# Patient Record
Sex: FEMALE | Race: WHITE | NOT HISPANIC OR LATINO | Employment: FULL TIME | URBAN - METROPOLITAN AREA
[De-identification: names, ages, dates, MRNs, and addresses within clinical notes are randomized per-mention and may not be internally consistent; named-entity substitution may affect disease eponyms.]

---

## 2017-01-13 ENCOUNTER — ALLSCRIPTS OFFICE VISIT (OUTPATIENT)
Dept: OTHER | Facility: OTHER | Age: 57
End: 2017-01-13

## 2017-01-14 ENCOUNTER — GENERIC CONVERSION - ENCOUNTER (OUTPATIENT)
Dept: OTHER | Facility: OTHER | Age: 57
End: 2017-01-14

## 2017-02-14 ENCOUNTER — ALLSCRIPTS OFFICE VISIT (OUTPATIENT)
Dept: OTHER | Facility: OTHER | Age: 57
End: 2017-02-14

## 2017-03-02 ENCOUNTER — ALLSCRIPTS OFFICE VISIT (OUTPATIENT)
Dept: OTHER | Facility: OTHER | Age: 57
End: 2017-03-02

## 2017-04-20 ENCOUNTER — APPOINTMENT (OUTPATIENT)
Dept: LAB | Facility: CLINIC | Age: 57
End: 2017-04-20
Payer: COMMERCIAL

## 2017-04-20 ENCOUNTER — TRANSCRIBE ORDERS (OUTPATIENT)
Dept: LAB | Facility: CLINIC | Age: 57
End: 2017-04-20

## 2017-04-20 DIAGNOSIS — Z13.220 SCREENING FOR LIPOID DISORDERS: Primary | ICD-10-CM

## 2017-04-20 DIAGNOSIS — Z13.220 SCREENING FOR LIPOID DISORDERS: ICD-10-CM

## 2017-04-20 LAB
ALBUMIN SERPL BCP-MCNC: 3.3 G/DL (ref 3.5–5)
ALP SERPL-CCNC: 48 U/L (ref 46–116)
ALT SERPL W P-5'-P-CCNC: 19 U/L (ref 12–78)
ANION GAP SERPL CALCULATED.3IONS-SCNC: 6 MMOL/L (ref 4–13)
AST SERPL W P-5'-P-CCNC: 11 U/L (ref 5–45)
BILIRUB SERPL-MCNC: 0.4 MG/DL (ref 0.2–1)
BUN SERPL-MCNC: 12 MG/DL (ref 5–25)
CALCIUM SERPL-MCNC: 8.6 MG/DL (ref 8.3–10.1)
CHLORIDE SERPL-SCNC: 103 MMOL/L (ref 100–108)
CHOLEST SERPL-MCNC: 182 MG/DL (ref 50–200)
CO2 SERPL-SCNC: 31 MMOL/L (ref 21–32)
CREAT SERPL-MCNC: 0.57 MG/DL (ref 0.6–1.3)
GFR SERPL CREATININE-BSD FRML MDRD: >60 ML/MIN/1.73SQ M
GLUCOSE P FAST SERPL-MCNC: 81 MG/DL (ref 65–99)
HDLC SERPL-MCNC: 71 MG/DL (ref 40–60)
LDLC SERPL CALC-MCNC: 94 MG/DL (ref 0–100)
LDLC SERPL DIRECT ASSAY-MCNC: 92 MG/DL (ref 0–100)
POTASSIUM SERPL-SCNC: 3.7 MMOL/L (ref 3.5–5.3)
PROT SERPL-MCNC: 6.5 G/DL (ref 6.4–8.2)
SODIUM SERPL-SCNC: 140 MMOL/L (ref 136–145)
T4 SERPL-MCNC: 6.5 UG/DL (ref 4.7–13.3)
TRIGL SERPL-MCNC: 86 MG/DL
TSH SERPL DL<=0.05 MIU/L-ACNC: 2.88 UIU/ML (ref 0.36–3.74)

## 2017-04-20 PROCEDURE — 84436 ASSAY OF TOTAL THYROXINE: CPT

## 2017-04-20 PROCEDURE — 84443 ASSAY THYROID STIM HORMONE: CPT

## 2017-04-20 PROCEDURE — 80053 COMPREHEN METABOLIC PANEL: CPT

## 2017-04-20 PROCEDURE — 80061 LIPID PANEL: CPT

## 2017-04-20 PROCEDURE — 83721 ASSAY OF BLOOD LIPOPROTEIN: CPT

## 2017-04-20 PROCEDURE — 36415 COLL VENOUS BLD VENIPUNCTURE: CPT

## 2017-04-21 ENCOUNTER — GENERIC CONVERSION - ENCOUNTER (OUTPATIENT)
Dept: OTHER | Facility: OTHER | Age: 57
End: 2017-04-21

## 2017-09-12 ENCOUNTER — GENERIC CONVERSION - ENCOUNTER (OUTPATIENT)
Dept: OTHER | Facility: OTHER | Age: 57
End: 2017-09-12

## 2017-12-12 ENCOUNTER — ALLSCRIPTS OFFICE VISIT (OUTPATIENT)
Dept: OTHER | Facility: OTHER | Age: 57
End: 2017-12-12

## 2017-12-14 NOTE — PROGRESS NOTES
Assessment    1  Acute sinusitis (461 9) (J01 90)    Plan  Acute sinusitis    · Amoxicillin-Pot Clavulanate 875-125 MG Oral Tablet; TAKE 1 TABLET EVERY 12HOURS WITH MEALS UNTIL GONE   · Follow-up PRN Evaluation and Treatment  Follow-up  Status: Complete  Done:73Asr0239    Discussion/Summary    Brock Beltrán is stable on exam  She is to f/u PRN  will start her on Augmentin x 10 days, OTC Cough and Cold Preps PRN, is to rest, and hydrate well  The patient was counseled regarding instructions for management,-- impressions,-- importance of compliance with treatment  Possible side effects of new medications were reviewed with the patient/guardian today  The treatment plan was reviewed with the patient/guardian  The patient/guardian understands and agrees with the treatment plan      Chief Complaint  PT is being seen today due to having chills, headache, sore throat X 2 days  No fevers  Occ cough  No real sinus pain  contact -  (I saw him last week)  History of Present Illness  HPI: As above  Review of Systems   Constitutional: as noted in HPI   ENT: as noted in HPI  Respiratory: as noted in HPI  Active Problems  1  Adenomatous colon polyp (211 3) (D12 6)   2  Allergic rhinitis (477 9) (J30 9)   3  Bilateral lower extremity edema (782 3) (R60 0)   4  Encounter for screening for malignant neoplasm of colon (V76 51) (Z12 11)   5  Lipid screening (V77 91) (Z13 220)   6  Rhus dermatitis (692 6) (L23 7)   7  Screening for breast cancer (V76 10) (Z12 31)   8  Well adult on routine health check (V70 0) (Z00 00)    Past Medical History  1  History of Acute bacterial conjunctivitis of right eye (372 03) (H10 31)   2  History of Ecchymoses, spontaneous (782 7) (R23 3)   3  History of H/O total hysterectomy (V88 01) (Z90 710)   4  History of conjunctivitis (V12 49) (Z86 69)   5  History of diarrhea (V12 79) (Z87 898)   6  History of edema (V13 89) (Z87 898)   7  History of herpes zoster (V12 09) (Z86 19)   8  History of impetigo (V13 3) (Z87 2)   9  History of upper respiratory infection (V12 09) (Z87 09)   10  History of urinary stone (V13 01) (Z87 442)   11  History of Lipid screening (V77 91) (Z13 220)   12  History of Sore throat (462) (J02 9)   13  History of Sorethroat (462) (J02 9)   14  History of Subconjunctival hemorrhage of right eye (372 72) (H11 31)   15  History of Subconjunctival hemorrhage of right eye (372 72) (H11 31)   16  History of Swelling of left hand (729 81) (M79 89)  Active Problems And Past Medical History Reviewed: The active problems and past medical history were reviewed and updated today  Family History  Mother    1  Family history of Acute Myocardial Infarction (V17 3)   2  Family history of Aortic Aneurysm   3  Family history of Chronic Obstructive Pulmonary Disease   4  Family history of Coronary Artery Disease (V17 49)   5  Family history of Diabetes Mellitus (V18 0)  Father    6  Family history of Chronic Obstructive Pulmonary Disease  Sister    7  Family history of malignant neoplasm of breast (V16 3) (Z80 3)  Paternal Grandmother    6  Family history of Diabetes Mellitus (V18 0)  Paternal Grandfather    5  Family history of Stroke Syndrome (V17 1)  Paternal Aunt    8  Family history of Breast Cancer (V16 3)  Family History    11  Denied: Family history of Colon Cancer   12  Denied: Family history of Crohn's Disease   13  Denied: Family history of Liver Cancer    Social History   · Being A Social Drinker   · Former smoker (Q29 92) (V21 543)    Surgical History    1  History of  Section   2  History of Total Abdominal Hysterectomy With Removal Of Both Ovaries    Current Meds   1  Acidophilus Xtra Oral Tablet; Take 1 tablet daily as directed; Therapy: 36DBA2428 to Recorded   2  Betamethasone Dipropionate 0 05 % External Cream; APPLY SPARINGLY TO AFFECTED AREA(S) TWICE DAILY; Therapy: 33VXE7054 to (Last Rx:2017)  Requested for: 53SYB1033 Ordered   3   Caltrate 600+D 600-800 MG-UNIT Oral Tablet; TAKE 1 TABLET ORALLY DAILY (SUPPLEMENT); Therapy: 10ILQ0574 to (Evaluate:34Jbm2368) Recorded   4  Fish Oil 1000 MG Oral Capsule; TAKE 1 CAPSULE DAILY; Therapy: 04BDS8289 to Recorded   5  Minivelle 0 1 MG/24HR Transdermal Patch Twice Weekly; Therapy: 70AJK7617 to Recorded   6  Multi-Vitamin Daily Oral Tablet; TAKE 1 TABLET DAILY; Therapy: 28TVP7347 to Recorded    The medication list was reviewed and updated today  Allergies  1  No Known Drug Allergies  2  No Known Environmental Allergies   3  No Known Food Allergies    Vitals   Recorded: 59Eea1179 03:28PM   Heart Rate 60   Respiration 16   Systolic 198   Diastolic 78   Height 5 ft 4 53 in   Weight 136 lb 8 oz   BMI Calculated 23 05   BSA Calculated 1 67       Physical Exam   Constitutional  General appearance: No acute distress, well appearing and well nourished  -- NAD; VSS; pleasant  Eyes  Conjunctiva and lids: No swelling, erythema or discharge  Ears, Nose, Mouth, and Throat  External inspection of ears and nose: Normal    Otoscopic examination: Tympanic membranes translucent with normal light reflex  Canals patent without erythema  Nasal mucosa, septum, and turbinates: Abnormal  -- NM boggy, with +/-TTP over the frontal sinuses  Oropharynx: Normal with no erythema, edema, exudate or lesions  Pulmonary  Respiratory effort: No increased work of breathing or signs of respiratory distress  Auscultation of lungs: Clear to auscultation  Cardiovascular  Auscultation of heart: Normal rate and rhythm, normal S1 and S2, without murmurs  Lymphatic  Palpation of lymph nodes in neck: No lymphadenopathy     Musculoskeletal  Gait and station: Normal    Psychiatric  Orientation to person, place, and time: Normal    Mood and affect: Normal          Future Appointments    Date/Time Provider Specialty Site   02/20/2018 05:00 PM Catherine Henderson [address]       Signatures   Electronically signed by : Rodrigo Gale DO; Dec 13 2017  7:27AM EST                       (Author)

## 2018-01-09 NOTE — PROGRESS NOTES
Assessment    1  Well adult on routine health check (V70 0) (Z00 00)   2  Lipid screening (V77 91) (Z13 220)    Plan  Lipid screening, Well adult on routine health check    · (1922 [facility]) Niki Fagan CMP14 Default; Status:Active; Requested for:15Jpr1590;    · (LC) Lipid Panel With LDL/HDL Ratio; Status:Active; Requested for:28Mso1098;    · (LC) TSH+Free T4; Status:Active; Requested for:14Feb2017;     Discussion/Summary  health maintenance visit Currently, she eats a healthy diet and has an adequate exercise regimen  HYSTERECTOMY Breast cancer screening: mammogram is current  Colorectal cancer screening: colorectal cancer screening is current  The immunizations are up to date  She was advised to be evaluated by an ophthalmologist  Advice and education were given regarding aerobic exercise  Patient discussion: discussed with the patient  Chief Complaint  Patient here for Annual Wellness exam      History of Present Illness  HM, Adult Female: The patient is being seen for a health maintenance evaluation  General Health: The patient's health since the last visit is described as good  She has regular dental visits  She denies vision problems  She denies hearing loss  Immunizations status: up to date  Lifestyle:  She consumes a diverse and healthy diet  She does not have any weight concerns  She exercises regularly  She does not use tobacco  She denies alcohol use  She denies drug use  Reproductive health: the patient is postmenopausal    Screening: cancer screening reviewed and updated  Cervical cancer screening includes HYSTERECTY  Breast cancer screening includes a mammogram performed last year  Colorectal cancer screening includes a colonoscopy performed within the past ten years  metabolic screening reviewed and updated  Metabolic screening includes lipid profile performed within the past five years, glucose screening performed last year and thyroid function test performed last year     HPI: [address] Review of Systems    Constitutional: No fever, no chills, feels well, no tiredness, no recent weight gain or weight loss  Eyes: No complaints of eye pain, no red eyes, no eyesight problems, no discharge, no dry eyes, no itching of eyes  ENT: no complaints of earache, no loss of hearing, no nose bleeds, no nasal discharge, no sore throat, no hoarseness  Cardiovascular: No complaints of slow heart rate, no fast heart rate, no chest pain, no palpitations, no leg claudication, no lower extremity edema  Respiratory: No complaints of shortness of breath, no wheezing, no cough, no SOB on exertion, no orthopnea, no PND  Gastrointestinal: No complaints of abdominal pain, no constipation, no nausea or vomiting, no diarrhea, no bloody stools  Genitourinary: No complaints of dysuria, no incontinence, no pelvic pain, no dysmenorrhea, no vaginal discharge or bleeding  Musculoskeletal: No complaints of arthralgias, no myalgias, no joint swelling or stiffness, no limb pain or swelling  Integumentary: No complaints of skin rash or lesions, no itching, no skin wounds, no breast pain or lump  Neurological: No complaints of headache, no confusion, no convulsions, no numbness, no dizziness or fainting, no tingling, no limb weakness, no difficulty walking  Psychiatric: Not suicidal, no sleep disturbance, no anxiety or depression, no change in personality, no emotional problems  Endocrine: No complaints of proptosis, no hot flashes, no muscle weakness, no deepening of the voice, no feelings of weakness  Hematologic/Lymphatic: No complaints of swollen glands, no swollen glands in the neck, does not bleed easily, does not bruise easily  Active Problems    1  Adenomatous colon polyp (211 3) (D12 6)   2  Allergic rhinitis (477 9) (J30 9)   3  Encounter for screening for malignant neoplasm of colon (V76 51) (Z12 11)   4  Screening for breast cancer (V76 10) (Z12 39)   5   Well adult on routine health check (V70 0) (Z00 00)    Past Medical History    · History of Acute bacterial conjunctivitis of right eye (372 03) (H10 31)   · History of Ecchymoses, spontaneous (782 7) (R23 3)   · History of H/O total hysterectomy (V88 01) (Z90 710)   · History of conjunctivitis (V12 49) (Z86 69)   · History of diarrhea (V12 79) (J95 625)   · History of edema (V13 89) (M22 023)   · History of herpes zoster (V12 09) (Z86 19)   · History of impetigo (V13 3) (Z87 2)   · History of upper respiratory infection (V12 09) (Z87 09)   · History of urinary stone (V13 01) (Z87 442)   · History of Lipid screening (V77 91) (Z13 220)   · History of Sore throat (462) (J02 9)   · History of Sorethroat (462) (J02 9)   · History of Subconjunctival hemorrhage of right eye (372 72) (H11 31)   · History of Subconjunctival hemorrhage of right eye (372 72) (H11 31)   · History of Swelling of left hand (729 81) (M79 89)    Surgical History    · History of  Section   · History of Total Abdominal Hysterectomy With Removal Of Both Ovaries    Family History  Mother    · Family history of Acute Myocardial Infarction (V17 3)   · Family history of Aortic Aneurysm   · Family history of Chronic Obstructive Pulmonary Disease   · Family history of Coronary Artery Disease (V17 49)   · Family history of Diabetes Mellitus (V18 0)  Father    · Family history of Chronic Obstructive Pulmonary Disease  Sister    · Family history of malignant neoplasm of breast (V16 3) (Z80 3)  Paternal Grandmother    · Family history of Diabetes Mellitus (V18 0)  Paternal Grandfather    · Family history of Stroke Syndrome (V17 1)  Paternal Aunt    · Family history of Breast Cancer (V16 3)  Family History    · Denied: Family history of Colon Cancer   · Denied: Family history of Crohn's Disease   · Denied: Family history of Liver Cancer    Social History    · Being A Social Drinker   · Former smoker (S57 02) (J18 955)    Current Meds   1  Acidophilus Xtra Oral Tablet;  Take 1 tablet daily as directed; Therapy: 34XMZ6359 to Recorded   2  Caltrate 600+D 600-800 MG-UNIT Oral Tablet; TAKE 1 TABLET ORALLY DAILY   (SUPPLEMENT); Therapy: 72MGE9750 to (Evaluate:04Mav3130) Recorded   3  Fish Oil 1000 MG Oral Capsule; TAKE 1 CAPSULE DAILY; Therapy: 61JFU4787 to Recorded   4  Minivelle 0 1 MG/24HR Transdermal Patch Twice Weekly; Therapy: 26JXJ9415 to Recorded   5  Multi-Vitamin Daily Oral Tablet; TAKE 1 TABLET DAILY; Therapy: 22BCC6039 to Recorded    Allergies    1  No Known Drug Allergies    2  No Known Environmental Allergies   3  No Known Food Allergies    Vitals   Recorded: 18MOD5573 04:53PM   Heart Rate 56   Respiration 18   Systolic 512   Diastolic 80   Height 5 ft 4 53 in   Weight 137 lb    BMI Calculated 23 13   BSA Calculated 1 68     Physical Exam    Constitutional   General appearance: No acute distress, well appearing and well nourished  Head and Face   Head and face: Normal     Eyes   Conjunctiva and lids: No swelling, erythema or discharge  Pupils and irises: Equal, round, reactive to light  Ears, Nose, Mouth, and Throat   External inspection of ears and nose: Normal     Otoscopic examination: Tympanic membranes translucent with normal light reflex  Canals patent without erythema  Hearing: Normal     Nasal mucosa, septum, and turbinates: Normal without edema or erythema  Lips, teeth, and gums: Normal, good dentition  Oropharynx: Normal with no erythema, edema, exudate or lesions  Neck   Neck: Supple, symmetric, trachea midline, no masses  Thyroid: Normal, no thyromegaly  Pulmonary   Respiratory effort: No increased work of breathing or signs of respiratory distress  Auscultation of lungs: Clear to auscultation  Cardiovascular   Auscultation of heart: Normal rate and rhythm, normal S1 and S2, no murmurs  Examination of extremities for edema and/or varicosities: Normal     Abdomen   Abdomen: Non-tender, no masses      Liver and spleen: No hepatomegaly or splenomegaly  Lymphatic   Palpation of lymph nodes in neck: No lymphadenopathy  Palpation of lymph nodes in axillae: No lymphadenopathy  Musculoskeletal   Gait and station: Normal     Digits and nails: Normal without clubbing or cyanosis  Joints, bones, and muscles: Normal     Range of motion: Normal     Stability: Normal     Muscle strength/tone: Normal     Skin   Skin and subcutaneous tissue: Normal without rashes or lesions  Palpation of skin and subcutaneous tissue: Normal turgor  Neurologic   Cranial nerves: Cranial nerves II-XII intact  Cortical function: Normal mental status  Reflexes: 2+ and symmetric  Sensation: No sensory loss  Coordination: Normal finger to nose and heel to shin  Psychiatric   Judgment and insight: Normal     Orientation to person, place, and time: Normal     Recent and remote memory: Intact  Mood and affect: Normal        Results/Data  PHQ-2 Adult Depression Screening 84VTP8215 04:55PM User, [facility]     Test Name Result Flag Reference   PHQ-2 Adult Depression Score 0     Over the last two weeks, how often have you been bothered by any of the following problems?   Little interest or pleasure in doing things: Not at all - 0  Feeling down, depressed, or hopeless: Not at all - 0   PHQ-2 Adult Depression Screening Negative         Signatures   Electronically signed by : Jerri Lowe DO; Feb 14 2017  5:10PM EST                       (Author)

## 2018-01-10 NOTE — MISCELLANEOUS
Message  started with vomiting and diarrhea on friday, vomiting improved, diarrhea worse  able to keep fluids in  recommend brat diet and gatorade and fluids  will fax note for her for jury duty      Plan  Sorethroat    · Amoxicillin-Pot Clavulanate 875-125 MG Oral Tablet; TAKE 1 TABLET EVERY Door [facility]    Signatures   Electronically signed by : Talia Reyes DO;  Apr 4 2016  8:20AM EST                       (Author)

## 2018-01-12 NOTE — RESULT NOTES
Verified Results  (1) THROAT CULTURE (CULTURE, UPPER RESPIRATORY) 42MGQ6409 12:00AM Ceola Cables     Test Name Result Flag Reference   Upper Respiratory Culture Final report     Result 1 Comment     Routine respiratory katina

## 2018-01-13 VITALS
TEMPERATURE: 98.1 F | HEART RATE: 68 BPM | RESPIRATION RATE: 16 BRPM | HEIGHT: 64 IN | DIASTOLIC BLOOD PRESSURE: 78 MMHG | SYSTOLIC BLOOD PRESSURE: 118 MMHG | BODY MASS INDEX: 23.45 KG/M2 | WEIGHT: 137.38 LBS

## 2018-01-13 NOTE — RESULT NOTES
Discussion/Summary   VERY GOOD    [facility]     Verified Results  (1) LDL,DIRECT 20Apr2017 07:10AM Murlean Flank     Test Name Result Flag Reference   LDL, DIRECT 92 mg/dl  0-100   This is a fasting blood test  Water,black tea or black  coffee only after 9:00pm the night before test  Drink 2 glasses of water the morning of test         LDL Cholesterol:        Optimal          <100 mg/dl        Near Optimal     100-129 mg/dl        Above Optimal          Borderline High   130-159 mg/dl          High              160-189 mg/dl          Very High        >189 mg/dl     (1) THYROXINE 20Apr2017 07:10AM Muran Flank     Test Name Result Flag Reference   T4 TOTAL 6 5 ug/dL  4 7-13 3     (1) COMPREHENSIVE METABOLIC PANEL 62ZQH5808 01:77TX Murlean Flank     Test Name Result Flag Reference   SODIUM 140 mmol/L  136-145   POTASSIUM 3 7 mmol/L  3 5-5 3   CHLORIDE 103 mmol/L  100-108   CARBON DIOXIDE 31 mmol/L  21-32   ANION GAP (CALC) 6 mmol/L  4-13   BLOOD UREA NITROGEN 12 mg/dL  5-25   CREATININE 0 57 mg/dL L 0 60-1 30   Standardized to IDMS reference method   CALCIUM 8 6 mg/dL  8 3-10 1   BILI, TOTAL 0 40 mg/dL  0 20-1 00   ALK PHOSPHATAS 48 U/L     ALT (SGPT) 19 U/L  12-78   AST(SGOT) 11 U/L  5-45   ALBUMIN 3 3 g/dL L 3 5-5 0   TOTAL PROTEIN 6 5 g/dL  6 4-8 2   eGFR Non-African American      >60 0 ml/min/1 [address] [facility] [employer] Disease Education Program recommendations are as follows:  GFR calculation is accurate only with a steady state creatinine  Chronic Kidney disease less than 60 ml/min/1 73 sq  meters  Kidney failure less than 15 ml/min/1 73 sq  meters  GLUCOSE FASTING 81 mg/dL  65-99     (1) LIPID PANEL, FASTING 20Apr2017 07:10AM Murlean Flank     Test Name Result Flag Reference   CHOLESTEROL 182 mg/dL     HDL,DIRECT 71 mg/dL H 40-60   Specimen collection should occur prior to Metamizole administration due to the potential for falsely depressed results     LDL CHOLESTEROL CALCULATED 94 mg/dL 0-100   This is a fasting blood test  Water,black tea or black  coffee only after 9:00pm the night before test  Drink 2 glasses of water the morning of test         Triglyceride:         Normal              <150 mg/dl       Borderline High    150-199 mg/dl       High               200-499 mg/dl       Very High          >499 mg/dl  Cholesterol:         Desirable        <200 mg/dl      Borderline High  200-239 mg/dl      High             >239 mg/dl  HDL Cholesterol:        High    >59 mg/dL      Low     <41 mg/dL  LDL CALCULATED:    This screening LDL is a calculated result  It does not have the accuracy of the Direct Measured LDL in the monitoring of patients with hyperlipidemia and/or statin therapy  Direct Measure LDL (JBS152) must be ordered separately in these patients  TRIGLYCERIDES 86 mg/dL  <=150   Specimen collection should occur prior to N-Acetylcysteine or Metamizole administration due to the potential for falsely depressed results  (1) TSH 20Apr2017 07:10AM Jodi [facility]     Test Name Result Flag Reference   TSH 2 880 uIU/mL  0 358-3 740   This bloodwork is non-fasting  Please drink two glasses of water morning of  bloodwork  Patients undergoing fluorescein dye angiography may retain small amounts of fluorescein in the body for 48-72 hours post procedure  Samples containing fluorescein can produce falsely depressed TSH values  If the patient had this procedure,a specimen should be resubmitted post fluorescein clearance            The recommended reference ranges for TSH during pregnancy are as follows:  First trimester 0 1 to 2 5 uIU/mL  Second trimester  0 2 to 3 0 uIU/mL  Third trimester 0 3 to 3 0 uIU/m

## 2018-01-14 VITALS
WEIGHT: 137.13 LBS | SYSTOLIC BLOOD PRESSURE: 112 MMHG | HEART RATE: 60 BPM | BODY MASS INDEX: 23.18 KG/M2 | DIASTOLIC BLOOD PRESSURE: 78 MMHG | TEMPERATURE: 97 F | RESPIRATION RATE: 16 BRPM

## 2018-01-14 VITALS
RESPIRATION RATE: 18 BRPM | BODY MASS INDEX: 22.82 KG/M2 | HEART RATE: 56 BPM | DIASTOLIC BLOOD PRESSURE: 80 MMHG | SYSTOLIC BLOOD PRESSURE: 126 MMHG | WEIGHT: 137 LBS | HEIGHT: 65 IN

## 2018-01-14 NOTE — RESULT NOTES
Verified Results  VAS UPPER LIMB VENOUS DUPLEX [address] [place] [address], UNILATERAL/LIMITED [id number] 04:44PM Bernardo Lauren Order Number: [id number]    - Patient Instructions: To schedule this appointment, please contact Central Scheduling at [phone]  Test Name Result Flag Reference   VAS UPPER LIMB VENOUS DUPLEX SCAN, UNILATERAL/LIMITED (Report)     [facility] REPORT   CLINICAL:   Indications: Other specified soft tissue disorders [M79 89]  The patient   presents with intermittent swelling and ecchymosis of the left thumb  Risk Factors: The patient has no history of DVT, limb trauma or malignancy  FINDINGS:      Left     Impression       Int  Jugular Normal (Patent)             CONCLUSION:   Impression   LEFT UPPER LIMB:   No evidence of acute or chronic deep vein thrombosis  No evidence of superficial thrombophlebitis noted  Doppler evaluation shows a normal response to augmentation maneuvers        SIGNATURE:   Electronically Signed by: Gamaliel Aggarwal MD on 2016-12-07 07:13:08 PM

## 2018-01-16 NOTE — RESULT NOTES
Verified Results  US EXTREMITY SOFT TISSUE [id number] 09:20AM [email or web address] Order Number: [id number]   Performing Comments: LEFT THUMB WITH SWELLING AND ECCHYMOSIS   - Patient Instructions: To schedule this appointment, please contact Central Scheduling at [phone]  Test Name Result Flag Reference   US EXTREMITY SOFT TISSUE (Report)     ULTRASOUND left thumb     TECHNIQUE:  Using a high frequency transducer, the left thumb was scanned to evaluate an area of swelling  INDICATION: Patient describes intermittent, repeated episodes of swelling in the left thumb, predominantly on the ulnar side of the interphalangeal joint  COMPARISON: No priors     FINDINGS:     The ulnar side of the left thumb interphalangeal joint was evaluated for possible mass, cyst, or edema  This area is normal  There is also no hyperemia  The rest of the thumb on the dorsal, volar, radial side were also scanned and are normal        IMPRESSION:     Normal ultrasound of the left thumb near the interphalangeal joint, where patient describes repeated episodes of swelling         Workstation performed: [id number]     Signed by:   Lucero Evans MD   12/8/16

## 2018-01-18 NOTE — RESULT NOTES
Verified Results  (1) STOOL CULTURE [id number] 01:00PM Lovena Mask     Test Name Result Flag Reference   Salmonella/Shigella Screen Final report     Result 1 Comment     No Salmonella or Shigella recovered  Campylobacter Culture Final report     Result 1 Comment     No Campylobacter species isolated     E coli Shiga Toxin EIA Negative  Negative

## 2018-01-22 VITALS
SYSTOLIC BLOOD PRESSURE: 114 MMHG | HEART RATE: 56 BPM | DIASTOLIC BLOOD PRESSURE: 64 MMHG | HEIGHT: 65 IN | WEIGHT: 139.38 LBS | RESPIRATION RATE: 18 BRPM | BODY MASS INDEX: 23.22 KG/M2

## 2018-01-23 VITALS
DIASTOLIC BLOOD PRESSURE: 78 MMHG | WEIGHT: 136.5 LBS | BODY MASS INDEX: 22.74 KG/M2 | HEIGHT: 65 IN | SYSTOLIC BLOOD PRESSURE: 120 MMHG | RESPIRATION RATE: 16 BRPM | HEART RATE: 60 BPM

## 2018-02-20 ENCOUNTER — OFFICE VISIT (OUTPATIENT)
Dept: FAMILY MEDICINE CLINIC | Facility: CLINIC | Age: 58
End: 2018-02-20
Payer: COMMERCIAL

## 2018-02-20 VITALS
RESPIRATION RATE: 16 BRPM | HEIGHT: 64 IN | DIASTOLIC BLOOD PRESSURE: 70 MMHG | HEART RATE: 56 BPM | BODY MASS INDEX: 23.7 KG/M2 | SYSTOLIC BLOOD PRESSURE: 110 MMHG | WEIGHT: 138.8 LBS

## 2018-02-20 DIAGNOSIS — Z12.31 ENCOUNTER FOR SCREENING MAMMOGRAM FOR BREAST CANCER: ICD-10-CM

## 2018-02-20 DIAGNOSIS — Z00.00 WELL ADULT EXAM: Primary | ICD-10-CM

## 2018-02-20 PROCEDURE — 99396 PREV VISIT EST AGE 40-64: CPT | Performed by: FAMILY MEDICINE

## 2018-02-20 RX ORDER — ESTRADIOL 0.1 MG/D
FILM, EXTENDED RELEASE TRANSDERMAL
COMMUNITY
Start: 2014-11-11 | End: 2018-07-26 | Stop reason: SDUPTHER

## 2018-02-20 RX ORDER — MELATONIN
1000 DAILY
COMMUNITY

## 2018-02-20 RX ORDER — CHLORAL HYDRATE 500 MG
1 CAPSULE ORAL DAILY
COMMUNITY
Start: 2015-11-12

## 2018-02-20 RX ORDER — BETAMETHASONE DIPROPIONATE 0.5 MG/G
CREAM TOPICAL 2 TIMES DAILY
COMMUNITY
Start: 2017-03-02 | End: 2020-02-03

## 2018-02-20 NOTE — PROGRESS NOTES
Assessment/Plan:       Diagnoses and all orders for this visit:    Well adult exam  Comments:  up to date with health maintanence    Encounter for screening mammogram for breast cancer  -     Mammo screening bilateral w cad; Future    Screening for colon cancer  -     Ambulatory referral to Gastroenterology; Future    Other orders  -     Probiotic Product (ACIDOPHILUS XTRA PO); Take 1 tablet by mouth daily  -     betamethasone dipropionate (DIPROSONE) 0 05 % cream; Apply topically 2 (two) times a day  -     Calcium Carb-Cholecalciferol (CALTRATE 600+D) 600-800 MG-UNIT TABS; Take 1 tablet by mouth daily  -     Omega-3 Fatty Acids (FISH OIL) 1,000 mg; Take 1 capsule by mouth daily  -     estradiol (MINIVELLE) 0 1 MG/24HR; Place on the skin  -     Multiple Vitamin (MULTI-VITAMIN DAILY PO); Take 1 tablet by mouth daily  -     cholecalciferol (VITAMIN D3) 1,000 units tablet; Take 1,000 Units by mouth daily          Subjective:      Patient ID: Summer Espinoza is a 65 y o  female  Well Adult Physical   Patient here for a comprehensive physical exam The patient reports no problems    Do you take any herbs or supplements that were not prescribed by a doctor? yes   Are you taking calcium supplements? yes   Are you taking aspirin daily? no     History:  LMP: No LMP recorded  Patient has had a hysterectomy  Menopause at age 49 years  Last pap date: not in years- OB retired, total hysterectomy  Abnormal pap? no  : 2  Para: 2  Mammogram 2017  Colonoscopy           The following portions of the patient's history were reviewed and updated as appropriate: allergies, current medications, past family history, past medical history, past social history, past surgical history and problem list     Review of Systems   Constitutional: Negative  HENT: Negative  Eyes: Negative  Respiratory: Negative  Cardiovascular: Negative  Gastrointestinal: Negative  Endocrine: Negative  Genitourinary: Negative  Musculoskeletal: Negative  Skin: Negative  Allergic/Immunologic: Negative  Neurological: Negative  Hematological: Negative  Psychiatric/Behavioral: Negative  Family History   Problem Relation Age of Onset    Heart attack Mother     Aneurysm Mother     COPD Mother     Coronary artery disease Mother     Diabetes Mother    Arely Charles COPD Father     Breast cancer Sister     Diabetes Paternal Grandmother     Stroke Paternal Grandfather     Breast cancer Paternal [de-identified]     Colon cancer Family     Crohn's disease Family     Liver cancer Family      Past Medical History:   Diagnosis Date    Ecchymoses, spontaneous     Last assessed 12/05/16    Edema     Last assessed 02/26/13    H/O total hysterectomy     Impetigo     last assessed 05/24/16     Social History     Social History    Marital status: /Civil Union     Spouse name: N/A    Number of children: N/A    Years of education: N/A     Occupational History    Not on file       Social History Main Topics    Smoking status: Former Smoker    Smokeless tobacco: Never Used    Alcohol use Yes      Comment: social    Drug use: No    Sexual activity: Yes     Partners: Male     Other Topics Concern    Not on file     Social History Narrative    No narrative on file       Current Outpatient Prescriptions:     Calcium Carb-Cholecalciferol (CALTRATE 600+D) 600-800 MG-UNIT TABS, Take 1 tablet by mouth daily, Disp: , Rfl:     cholecalciferol (VITAMIN D3) 1,000 units tablet, Take 1,000 Units by mouth daily, Disp: , Rfl:     estradiol (MINIVELLE) 0 1 MG/24HR, Place on the skin, Disp: , Rfl:     Multiple Vitamin (MULTI-VITAMIN DAILY PO), Take 1 tablet by mouth daily, Disp: , Rfl:     Omega-3 Fatty Acids (FISH OIL) 1,000 mg, Take 1 capsule by mouth daily, Disp: , Rfl:     Probiotic Product (ACIDOPHILUS XTRA PO), Take 1 tablet by mouth daily, Disp: , Rfl:     betamethasone dipropionate (DIPROSONE) 0 05 % cream, Apply topically 2 (two) times a day, Disp: , Rfl:   No Known Allergies      Objective:      /70 (BP Location: Right arm, Patient Position: Sitting, Cuff Size: Standard)   Pulse 56   Resp 16   Ht 5' 4 49" (1 638 m)   Wt 63 kg (138 lb 12 8 oz)   BMI 23 46 kg/m²          Physical Exam   Constitutional: She is oriented to person, place, and time  Vital signs are normal  She appears well-developed and well-nourished  HENT:   Head: Normocephalic and atraumatic  Nose: Nose normal    Mouth/Throat: Oropharynx is clear and moist    Eyes: Conjunctivae, EOM and lids are normal  Pupils are equal, round, and reactive to light  Neck: Normal range of motion  Neck supple  Cardiovascular: Normal rate, regular rhythm, S1 normal, S2 normal, normal heart sounds and intact distal pulses  Pulmonary/Chest: Effort normal and breath sounds normal    Abdominal: Soft  Bowel sounds are normal    Musculoskeletal: Normal range of motion  Neurological: She is alert and oriented to person, place, and time  She has normal strength and normal reflexes  Skin: Skin is warm, dry and intact  Psychiatric: She has a normal mood and affect  Her speech is normal and behavior is normal  Judgment and thought content normal  Cognition and memory are normal    Nursing note and vitals reviewed

## 2018-02-20 NOTE — PATIENT INSTRUCTIONS

## 2018-04-24 ENCOUNTER — OFFICE VISIT (OUTPATIENT)
Dept: FAMILY MEDICINE CLINIC | Facility: CLINIC | Age: 58
End: 2018-04-24
Payer: COMMERCIAL

## 2018-04-24 VITALS
HEART RATE: 64 BPM | RESPIRATION RATE: 12 BRPM | DIASTOLIC BLOOD PRESSURE: 68 MMHG | BODY MASS INDEX: 23.87 KG/M2 | TEMPERATURE: 97.8 F | SYSTOLIC BLOOD PRESSURE: 140 MMHG | WEIGHT: 141.2 LBS

## 2018-04-24 DIAGNOSIS — J01.11 ACUTE RECURRENT FRONTAL SINUSITIS: Primary | ICD-10-CM

## 2018-04-24 PROCEDURE — 99213 OFFICE O/P EST LOW 20 MIN: CPT | Performed by: FAMILY MEDICINE

## 2018-04-24 RX ORDER — AMOXICILLIN AND CLAVULANATE POTASSIUM 875; 125 MG/1; MG/1
1 TABLET, FILM COATED ORAL 2 TIMES DAILY WITH MEALS
Qty: 20 TABLET | Refills: 0 | Status: SHIPPED | OUTPATIENT
Start: 2018-04-24 | End: 2018-05-04

## 2018-04-24 NOTE — ASSESSMENT & PLAN NOTE
Pt is stable on exam   Will treat with Augmentin x 10 days, OTC Cough and Cold Preps PRN, rest, and good PO hydration

## 2018-04-24 NOTE — PROGRESS NOTES
Assessment/Plan:    Acute recurrent frontal sinusitis  Pt is stable on exam   Will treat with Augmentin x 10 days, OTC Cough and Cold Preps PRN, rest, and good PO hydration  Diagnoses and all orders for this visit:    Acute recurrent frontal sinusitis  -     amoxicillin-clavulanate (AUGMENTIN) 875-125 mg per tablet; Take 1 tablet by mouth 2 (two) times a day with meals for 10 days          Subjective:      Patient ID: Mickie Nj is a 65 y o  female  Sore Throat    This is a new problem  The current episode started in the past 7 days  There has been no fever  Associated symptoms include congestion, coughing and ear pain  Cough   Associated symptoms include ear pain and a sore throat  Pertinent negatives include no fever  Sinusitis   Associated symptoms include congestion, coughing, ear pain, sinus pressure and a sore throat  Earache    Associated symptoms include coughing and a sore throat         The following portions of the patient's history were reviewed and updated as appropriate: allergies, current medications, past family history, past social history, past surgical history and problem list     Past Medical History:   Diagnosis Date    Ecchymoses, spontaneous     Last assessed 12/05/16    Edema     Last assessed 02/26/13    H/O total hysterectomy     Impetigo     last assessed 05/24/16       Current Outpatient Prescriptions:     amoxicillin-clavulanate (AUGMENTIN) 875-125 mg per tablet, Take 1 tablet by mouth 2 (two) times a day with meals for 10 days, Disp: 20 tablet, Rfl: 0    betamethasone dipropionate (DIPROSONE) 0 05 % cream, Apply topically 2 (two) times a day, Disp: , Rfl:     Calcium Carb-Cholecalciferol (CALTRATE 600+D) 600-800 MG-UNIT TABS, Take 1 tablet by mouth daily, Disp: , Rfl:     cholecalciferol (VITAMIN D3) 1,000 units tablet, Take 1,000 Units by mouth daily, Disp: , Rfl:     estradiol (MINIVELLE) 0 1 MG/24HR, Place on the skin, Disp: , Rfl:     Multiple Vitamin (MULTI-VITAMIN DAILY PO), Take 1 tablet by mouth daily, Disp: , Rfl:     Omega-3 Fatty Acids (FISH OIL) 1,000 mg, Take 1 capsule by mouth daily, Disp: , Rfl:     Probiotic Product (ACIDOPHILUS XTRA PO), Take 1 tablet by mouth daily, Disp: , Rfl:     No Known Allergies      Review of Systems   Constitutional: Negative for activity change and fever  HENT: Positive for congestion, ear pain, sinus pressure and sore throat  Respiratory: Positive for cough  Objective:      /68 (BP Location: Right arm, Patient Position: Sitting, Cuff Size: Standard)   Pulse 64   Temp 97 8 °F (36 6 °C) (Tympanic)   Resp 12   Wt 64 kg (141 lb 3 2 oz)   BMI 23 87 kg/m²          Physical Exam   Constitutional: She is oriented to person, place, and time  She appears well-developed and well-nourished  HENT:   Head: Normocephalic and atraumatic  Right Ear: Hearing, tympanic membrane, external ear and ear canal normal    Left Ear: Hearing, tympanic membrane, external ear and ear canal normal    Nose: Mucosal edema present  Right sinus exhibits frontal sinus tenderness  Left sinus exhibits frontal sinus tenderness  Mouth/Throat: Oropharynx is clear and moist  No oropharyngeal exudate  Eyes: Conjunctivae are normal    Neck: Normal range of motion  Neck supple  No thyromegaly present  Cardiovascular: Normal rate, regular rhythm and normal heart sounds  Exam reveals no gallop and no friction rub  No murmur heard  Pulmonary/Chest: Effort normal and breath sounds normal  No respiratory distress  She has no wheezes  She has no rales  Lymphadenopathy:     She has no cervical adenopathy  Neurological: She is alert and oriented to person, place, and time  Skin: She is not diaphoretic  Psychiatric: She has a normal mood and affect  Her behavior is normal  Judgment and thought content normal    Nursing note and vitals reviewed

## 2018-07-02 ENCOUNTER — HOSPITAL ENCOUNTER (OUTPATIENT)
Dept: RADIOLOGY | Age: 58
Discharge: HOME | End: 2018-07-02
Payer: COMMERCIAL

## 2018-07-02 DIAGNOSIS — Z12.31 ENCOUNTER FOR SCREENING MAMMOGRAM FOR BREAST CANCER: ICD-10-CM

## 2018-07-02 PROCEDURE — 77063 BREAST TOMOSYNTHESIS BI: CPT

## 2018-07-02 PROCEDURE — 77067 SCR MAMMO BI INCL CAD: CPT

## 2018-07-26 DIAGNOSIS — N95.9 MENOPAUSAL DISORDER: Primary | ICD-10-CM

## 2018-07-26 RX ORDER — ESTRADIOL 0.1 MG/D
1 FILM, EXTENDED RELEASE TRANSDERMAL 2 TIMES WEEKLY
Qty: 8 PATCH | Refills: 0 | Status: SHIPPED | OUTPATIENT
Start: 2018-07-26 | End: 2018-08-23 | Stop reason: SDUPTHER

## 2018-07-26 NOTE — TELEPHONE ENCOUNTER
Patient said Yun[facility] Robert could fill as her OBGYN is retiring  Please fill estradiol (MINIVELLE) 0 1 MG/24HR, Route: Transdermal, Sig: Place on the skin  Patient gets a 90 supply (she gets 3 boxes with 8 patches in each box)  Please send to [facility] [phone]

## 2018-08-23 ENCOUNTER — OFFICE VISIT (OUTPATIENT)
Dept: FAMILY MEDICINE CLINIC | Facility: CLINIC | Age: 58
End: 2018-08-23
Payer: COMMERCIAL

## 2018-08-23 VITALS
HEIGHT: 64 IN | BODY MASS INDEX: 23.73 KG/M2 | OXYGEN SATURATION: 99 % | RESPIRATION RATE: 14 BRPM | TEMPERATURE: 97.4 F | SYSTOLIC BLOOD PRESSURE: 122 MMHG | HEART RATE: 50 BPM | DIASTOLIC BLOOD PRESSURE: 82 MMHG | WEIGHT: 139 LBS

## 2018-08-23 DIAGNOSIS — N95.9 MENOPAUSAL DISORDER: ICD-10-CM

## 2018-08-23 DIAGNOSIS — H92.02 LEFT EAR PAIN: Primary | ICD-10-CM

## 2018-08-23 PROBLEM — J01.11 ACUTE RECURRENT FRONTAL SINUSITIS: Status: RESOLVED | Noted: 2018-04-24 | Resolved: 2018-08-23

## 2018-08-23 PROCEDURE — 99213 OFFICE O/P EST LOW 20 MIN: CPT | Performed by: FAMILY MEDICINE

## 2018-08-23 PROCEDURE — 3008F BODY MASS INDEX DOCD: CPT | Performed by: FAMILY MEDICINE

## 2018-08-23 RX ORDER — ESTRADIOL 0.1 MG/D
1 FILM, EXTENDED RELEASE TRANSDERMAL 2 TIMES WEEKLY
Qty: 24 PATCH | Refills: 3 | Status: SHIPPED | OUTPATIENT
Start: 2018-08-23 | End: 2018-11-21

## 2018-08-23 NOTE — PROGRESS NOTES
Assessment/Plan:    Problem List Items Addressed This Visit     Left ear pain - Primary     likley dental vs allergic           Other Visit Diagnoses     Menopausal disorder        Relevant Medications    estradiol (MINIVELLE) 0 1 MG/24HR          There are no Patient Instructions on file for this visit  No Follow-up on file  Subjective:      Patient ID: Regina Santamaria is a 65 y o  female  Chief Complaint   Patient presents with   Sae Thurston     Patient here with left ear pain no discharge started yesterday        C/o left ear pain- yesterday  No other symptoms  Tylenol without relief      Earache    There is pain in the left ear  This is a new problem  The current episode started yesterday  The problem has been unchanged  There has been no fever  The pain is moderate  Pertinent negatives include no coughing, diarrhea, ear discharge, headaches, hearing loss, neck pain, rash, rhinorrhea, sore throat or vomiting  She has tried acetaminophen for the symptoms  The treatment provided no relief  The following portions of the patient's history were reviewed and updated as appropriate:  past social history    Review of Systems   Constitutional: Negative  HENT: Positive for dental problem (dental work on left upper side) and ear pain  Negative for ear discharge, hearing loss, rhinorrhea and sore throat  Eyes: Negative  Respiratory: Negative for cough  Cardiovascular: Negative  Gastrointestinal: Negative for diarrhea and vomiting  Endocrine: Negative  Genitourinary: Negative  Musculoskeletal: Negative for neck pain  Skin: Negative for rash  Allergic/Immunologic: Negative  Neurological: Negative for headaches  Hematological: Negative  Psychiatric/Behavioral: Negative            Current Outpatient Prescriptions   Medication Sig Dispense Refill    betamethasone dipropionate (DIPROSONE) 0 05 % cream Apply topically 2 (two) times a day      Calcium Carb-Cholecalciferol (CALTRATE 600+D) 600-800 MG-UNIT TABS Take 1 tablet by mouth daily      cholecalciferol (VITAMIN D3) 1,000 units tablet Take 1,000 Units by mouth daily      estradiol (MINIVELLE) 0 1 MG/24HR Place 1 patch on the skin 2 (two) times a week for 90 days 24 patch 3    Multiple Vitamin (MULTI-VITAMIN DAILY PO) Take 1 tablet by mouth daily      Omega-3 Fatty Acids (FISH OIL) 1,000 mg Take 1 capsule by mouth daily      Probiotic Product (ACIDOPHILUS XTRA PO) Take 1 tablet by mouth daily       No current facility-administered medications for this visit  Objective:    /82   Pulse (!) 50   Temp (!) 97 4 °F (36 3 °C)   Resp 14   Ht 5' 4 49" (1 638 m)   Wt 63 kg (139 lb)   SpO2 99%   BMI 23 50 kg/m²        Physical Exam   Constitutional: She appears well-developed and well-nourished  HENT:   Head: Normocephalic and atraumatic  Right Ear: External ear normal    Left Ear: External ear normal    Nose: Nose normal    Mouth/Throat: Oropharynx is clear and moist    Eyes: Pupils are equal, round, and reactive to light  Neck: Normal range of motion  Neck supple  Cardiovascular: Normal rate, regular rhythm, normal heart sounds and intact distal pulses  Pulmonary/Chest: Effort normal and breath sounds normal    Abdominal: Soft  Bowel sounds are normal    Musculoskeletal: Normal range of motion  Neurological: She is alert  Skin: Skin is warm and dry  Nursing note and vitals reviewed               Talia Reyes DO

## 2018-11-21 DIAGNOSIS — Z79.890 HORMONE REPLACEMENT THERAPY (HRT): Primary | ICD-10-CM

## 2018-11-21 DIAGNOSIS — Z79.890 HORMONE REPLACEMENT THERAPY (HRT): ICD-10-CM

## 2018-11-21 RX ORDER — ESTRADIOL 0.1 MG/D
1 FILM, EXTENDED RELEASE TRANSDERMAL 2 TIMES WEEKLY
Qty: 8 PATCH | Refills: 5 | Status: SHIPPED | OUTPATIENT
Start: 2018-11-22 | End: 2018-11-21 | Stop reason: SDUPTHER

## 2018-11-21 RX ORDER — ESTRADIOL 0.1 MG/D
1 FILM, EXTENDED RELEASE TRANSDERMAL 2 TIMES WEEKLY
Qty: 24 PATCH | Refills: 3 | Status: SHIPPED | OUTPATIENT
Start: 2018-11-22 | End: 2019-10-01 | Stop reason: SDUPTHER

## 2019-07-03 ENCOUNTER — TELEPHONE (OUTPATIENT)
Dept: FAMILY MEDICINE CLINIC | Facility: CLINIC | Age: 59
End: 2019-07-03

## 2019-07-03 DIAGNOSIS — Z12.39 SCREENING FOR BREAST CANCER: Primary | ICD-10-CM

## 2019-07-03 NOTE — TELEPHONE ENCOUNTER
Patient is scheduled for her physical with you on 9/24/19 but she is inquiring if you would be able to order her an updated routine mammogram screening bilateral w 3d & cad? Please advise

## 2019-07-31 ENCOUNTER — HOSPITAL ENCOUNTER (OUTPATIENT)
Dept: RADIOLOGY | Age: 59
Discharge: HOME | End: 2019-07-31
Payer: COMMERCIAL

## 2019-07-31 VITALS — HEIGHT: 65 IN | WEIGHT: 137 LBS | BODY MASS INDEX: 22.82 KG/M2

## 2019-07-31 DIAGNOSIS — Z12.39 SCREENING FOR BREAST CANCER: ICD-10-CM

## 2019-07-31 PROCEDURE — 77063 BREAST TOMOSYNTHESIS BI: CPT

## 2019-07-31 PROCEDURE — 77067 SCR MAMMO BI INCL CAD: CPT

## 2019-08-01 ENCOUNTER — TELEPHONE (OUTPATIENT)
Dept: FAMILY MEDICINE CLINIC | Facility: CLINIC | Age: 59
End: 2019-08-01

## 2019-09-17 ENCOUNTER — OFFICE VISIT (OUTPATIENT)
Dept: FAMILY MEDICINE CLINIC | Facility: CLINIC | Age: 59
End: 2019-09-17
Payer: COMMERCIAL

## 2019-09-17 VITALS
BODY MASS INDEX: 22.99 KG/M2 | HEIGHT: 65 IN | HEART RATE: 50 BPM | DIASTOLIC BLOOD PRESSURE: 70 MMHG | RESPIRATION RATE: 13 BRPM | WEIGHT: 138 LBS | OXYGEN SATURATION: 100 % | TEMPERATURE: 97.9 F | SYSTOLIC BLOOD PRESSURE: 120 MMHG

## 2019-09-17 DIAGNOSIS — H11.31 CONJUNCTIVAL HEMORRHAGE, RIGHT: Primary | ICD-10-CM

## 2019-09-17 DIAGNOSIS — D12.6 ADENOMATOUS POLYP OF COLON, UNSPECIFIED PART OF COLON: ICD-10-CM

## 2019-09-17 PROBLEM — H92.02 LEFT EAR PAIN: Status: RESOLVED | Noted: 2018-08-23 | Resolved: 2019-09-17

## 2019-09-17 PROCEDURE — 99213 OFFICE O/P EST LOW 20 MIN: CPT | Performed by: FAMILY MEDICINE

## 2019-09-17 NOTE — PROGRESS NOTES
Assessment/Plan:    1  Conjunctival hemorrhage, right  Assessment & Plan: To see ophtho        2  Adenomatous polyp of colon, unspecified part of colon  -     Ambulatory referral to Gastroenterology; Future              There are no Patient Instructions on file for this visit  No follow-ups on file  Subjective:      Patient ID: Ranulfo Ng is a 63 y o  female  Chief Complaint   Patient presents with   [facility] Problem     Patient here with right eye red burning feels sore        Right before vacation 2 weeks ago broke blood vessel in eye, cleared up 1 week later  yesteray out of work same eye with red center  Eye is sore  No discharge  No goop  Vision is ok  Computer all day    Eye Problem    The right eye is affected  This is a recurrent problem  The current episode started 1 to 4 weeks ago  The problem has been unchanged  There was no injury mechanism  There is no known exposure to pink eye  She does not wear contacts  Associated symptoms include eye redness  Pertinent negatives include no blurred vision or foreign body sensation  She has tried nothing for the symptoms  The following portions of the patient's history were reviewed and updated as appropriate:  past social history    Review of Systems   Constitutional: Negative  HENT: Negative  Eyes: Positive for redness  Negative for blurred vision  Respiratory: Negative  Cardiovascular: Negative  Gastrointestinal: Negative  Endocrine: Negative  Genitourinary: Negative  Musculoskeletal: Negative  Skin: Negative  Allergic/Immunologic: Negative  Neurological: Negative  Hematological: Negative  Psychiatric/Behavioral: Negative            Current Outpatient Medications   Medication Sig Dispense Refill    betamethasone dipropionate (DIPROSONE) 0 05 % cream Apply topically 2 (two) times a day      Calcium Carb-Cholecalciferol (CALTRATE 600+D) 600-800 MG-UNIT TABS Take 1 tablet by mouth daily      cholecalciferol (VITAMIN D3) 1,000 units tablet Take 1,000 Units by mouth daily      Multiple Vitamin (MULTI-VITAMIN DAILY PO) Take 1 tablet by mouth daily      Omega-3 Fatty Acids (FISH OIL) 1,000 mg Take 1 capsule by mouth daily      Probiotic Product (ACIDOPHILUS XTRA PO) Take 1 tablet by mouth daily      estradiol (MINIVELLE) 0 1 MG/24HR Place 1 patch on the skin 2 (two) times a week for 90 days 24 patch 3    estradiol (VIVELLE-DOT) 0 1 MG/24HR Place 1 patch on the skin 2 (two) times a week for 90 days 24 patch 3     No current facility-administered medications for this visit  Objective:    /70   Pulse (!) 50   Temp 97 9 °F (36 6 °C)   Resp 13   Ht 5' 5" (1 651 m)   Wt 62 6 kg (138 lb)   SpO2 100%   BMI 22 96 kg/m²        Physical Exam   Eyes: Pupils are equal, round, and reactive to light  EOM and lids are normal  Right conjunctiva is injected  Neck: Normal range of motion  Neck supple  Cardiovascular: Normal rate, regular rhythm, normal heart sounds and intact distal pulses  Pulmonary/Chest: Effort normal and breath sounds normal    Abdominal: Soft  Bowel sounds are normal    Musculoskeletal: Normal range of motion                Liana Arenas DO

## 2019-09-30 ENCOUNTER — OFFICE VISIT (OUTPATIENT)
Dept: FAMILY MEDICINE CLINIC | Facility: CLINIC | Age: 59
End: 2019-09-30
Payer: COMMERCIAL

## 2019-09-30 VITALS
WEIGHT: 137.8 LBS | DIASTOLIC BLOOD PRESSURE: 74 MMHG | SYSTOLIC BLOOD PRESSURE: 130 MMHG | TEMPERATURE: 97.6 F | OXYGEN SATURATION: 98 % | HEART RATE: 53 BPM | RESPIRATION RATE: 14 BRPM | BODY MASS INDEX: 22.96 KG/M2 | HEIGHT: 65 IN

## 2019-09-30 DIAGNOSIS — H00.025 HORDEOLUM INTERNUM OF LEFT LOWER EYELID: Primary | ICD-10-CM

## 2019-09-30 PROBLEM — H11.31 CONJUNCTIVAL HEMORRHAGE, RIGHT: Status: RESOLVED | Noted: 2019-09-17 | Resolved: 2019-09-30

## 2019-09-30 PROCEDURE — 99213 OFFICE O/P EST LOW 20 MIN: CPT | Performed by: FAMILY MEDICINE

## 2019-09-30 RX ORDER — ERYTHROMYCIN 5 MG/G
0.5 OINTMENT OPHTHALMIC EVERY 6 HOURS SCHEDULED
Qty: 3.5 G | Refills: 0 | Status: SHIPPED | OUTPATIENT
Start: 2019-09-30 | End: 2020-02-03

## 2019-09-30 NOTE — PROGRESS NOTES
Assessment/Plan:    1  Hordeolum internum of left lower eyelid  Assessment & Plan:  Warm compresses  erythro ointment    Orders:  -     erythromycin (ILOTYCIN) ophthalmic ointment; Administer 0 5 inches into the left eye every 6 (six) hours                There are no Patient Instructions on file for this visit  No follow-ups on file  Subjective:      Patient ID: Keren Gurrola is a 63 y o  female  Chief Complaint   Patient presents with   [facility] Problem     Patient here with left lower eye lid style red painful since Saturday morning        Here for acute eye issue  Saturday morning woke up with pain in eye lid  Left eye- at first didn't see anything  Sunday started with bump  Warm compresses    Eye Pain    The left eye is affected  This is a new problem  The current episode started in the past 7 days  The problem occurs constantly  There was no injury mechanism  The pain is at a severity of 3/10  There is no known exposure to pink eye  She does not wear contacts  Pertinent negatives include no blurred vision, double vision, foreign body sensation, photophobia or recent URI  She has tried nothing for the symptoms  The following portions of the patient's history were reviewed and updated as appropriate:  past social history    Review of Systems   Constitutional: Negative  HENT: Negative  Eyes: Positive for pain  Negative for blurred vision, double vision and photophobia  Respiratory: Negative  Cardiovascular: Negative  Gastrointestinal: Negative  Endocrine: Negative  Genitourinary: Negative  Musculoskeletal: Negative  Skin: Negative  Allergic/Immunologic: Negative  Neurological: Negative  Hematological: Negative  Psychiatric/Behavioral: Negative            Current Outpatient Medications   Medication Sig Dispense Refill    betamethasone dipropionate (DIPROSONE) 0 05 % cream Apply topically 2 (two) times a day      Calcium Carb-Cholecalciferol (CALTRATE 600+D) 600-800 MG-UNIT TABS Take 1 tablet by mouth daily      cholecalciferol (VITAMIN D3) 1,000 units tablet Take 1,000 Units by mouth daily      Multiple Vitamin (MULTI-VITAMIN DAILY PO) Take 1 tablet by mouth daily      Omega-3 Fatty Acids (FISH OIL) 1,000 mg Take 1 capsule by mouth daily      Probiotic Product (ACIDOPHILUS XTRA PO) Take 1 tablet by mouth daily      erythromycin (ILOTYCIN) ophthalmic ointment Administer 0 5 inches into the left eye every 6 (six) hours 3 5 g 0    estradiol (MINIVELLE) 0 1 MG/24HR Place 1 patch on the skin 2 (two) times a week for 90 days 24 patch 3    estradiol (VIVELLE-DOT) 0 1 MG/24HR Place 1 patch on the skin 2 (two) times a week for 90 days 24 patch 3     No current facility-administered medications for this visit  Objective:    /74   Pulse (!) 53   Temp 97 6 °F (36 4 °C)   Resp 14   Ht 5' 5" (1 651 m)   Wt 62 5 kg (137 lb 12 8 oz)   SpO2 98%   BMI 22 93 kg/m²        Physical Exam   Constitutional: She appears well-developed and well-nourished  Eyes: Pupils are equal, round, and reactive to light  Conjunctivae and EOM are normal  Left eye exhibits hordeolum  Neck: Normal range of motion  Neck supple  Cardiovascular: Normal rate, regular rhythm, normal heart sounds and intact distal pulses  Pulmonary/Chest: Effort normal and breath sounds normal    Abdominal: Soft  Bowel sounds are normal    Nursing note and vitals reviewed               Dilan Gibson DO

## 2019-10-01 ENCOUNTER — TELEPHONE (OUTPATIENT)
Dept: FAMILY MEDICINE CLINIC | Facility: CLINIC | Age: 59
End: 2019-10-01

## 2019-10-01 DIAGNOSIS — Z79.890 HORMONE REPLACEMENT THERAPY (HRT): ICD-10-CM

## 2019-10-01 RX ORDER — ESTRADIOL 0.1 MG/D
FILM, EXTENDED RELEASE TRANSDERMAL
Qty: 24 PATCH | Refills: 4 | Status: SHIPPED | OUTPATIENT
Start: 2019-10-01 | End: 2020-11-24

## 2019-10-01 NOTE — TELEPHONE ENCOUNTER
Patient called and stated that she saw Dr Nancy Bourgeois yesterday , she forgot to mention the she has a sty on her left eye, patient wanted to know if it is contagious as she is going to be going to another appointment today, she also wants to know about how long it usually takes for it to go away

## 2019-10-01 NOTE — TELEPHONE ENCOUNTER
She is not contagious and it might take 1 week to improve   If it gets worse will need to see lorenzo

## 2019-10-07 ENCOUNTER — OFFICE VISIT (OUTPATIENT)
Dept: FAMILY MEDICINE CLINIC | Facility: CLINIC | Age: 59
End: 2019-10-07
Payer: COMMERCIAL

## 2019-10-07 VITALS
TEMPERATURE: 97.6 F | DIASTOLIC BLOOD PRESSURE: 74 MMHG | BODY MASS INDEX: 22.99 KG/M2 | SYSTOLIC BLOOD PRESSURE: 112 MMHG | HEIGHT: 65 IN | HEART RATE: 51 BPM | WEIGHT: 138 LBS | OXYGEN SATURATION: 97 % | RESPIRATION RATE: 16 BRPM

## 2019-10-07 DIAGNOSIS — H00.025 HORDEOLUM INTERNUM OF LEFT LOWER EYELID: Primary | ICD-10-CM

## 2019-10-07 PROCEDURE — 3008F BODY MASS INDEX DOCD: CPT | Performed by: FAMILY MEDICINE

## 2019-10-07 PROCEDURE — 99213 OFFICE O/P EST LOW 20 MIN: CPT | Performed by: FAMILY MEDICINE

## 2019-10-07 RX ORDER — DOXYCYCLINE 100 MG/1
100 CAPSULE ORAL 2 TIMES DAILY
Qty: 20 CAPSULE | Refills: 0 | Status: SHIPPED | OUTPATIENT
Start: 2019-10-07 | End: 2019-10-17

## 2019-10-07 NOTE — PROGRESS NOTES
Assessment/Plan:    No problem-specific Assessment & Plan notes found for this encounter  Diagnoses and all orders for this visit:    Hordeolum internum of left lower eyelid  Comments:  Pt is stable on exam   Continue warm compresses and Eryth Ophth ointment; will add PO Doxycycline  Pt to f/u with her Ophthalmologist if no resolution  Orders:  -     doxycycline monohydrate (MONODOX) 100 mg capsule; Take 1 capsule (100 mg total) by mouth 2 (two) times a day for 10 days          Subjective:      Patient ID: Ronald Banda is a 63 y o  female  Corina Aidanin presents today with the c/o stye to the left lower lid x 2 weeks  Is using the previously ordered Erythromycin Ophth ointments, warm compresses, etc, but remains          The following portions of the patient's history were reviewed and updated as appropriate: allergies, current medications, past family history, past social history, past surgical history and problem list     Past Medical History:   Diagnosis Date    Conjunctival hemorrhage, right 9/17/2019    Ecchymoses, spontaneous     Last assessed 12/05/16    Edema     Last assessed 02/26/13    H/O total hysterectomy     Impetigo     last assessed 05/24/16    Left ear pain 8/23/2018       Current Outpatient Medications:     betamethasone dipropionate (DIPROSONE) 0 05 % cream, Apply topically 2 (two) times a day, Disp: , Rfl:     Calcium Carb-Cholecalciferol (CALTRATE 600+D) 600-800 MG-UNIT TABS, Take 1 tablet by mouth daily, Disp: , Rfl:     cholecalciferol (VITAMIN D3) 1,000 units tablet, Take 1,000 Units by mouth daily, Disp: , Rfl:     doxycycline monohydrate (MONODOX) 100 mg capsule, Take 1 capsule (100 mg total) by mouth 2 (two) times a day for 10 days, Disp: 20 capsule, Rfl: 0    erythromycin (ILOTYCIN) ophthalmic ointment, Administer 0 5 inches into the left eye every 6 (six) hours, Disp: 3 5 g, Rfl: 0    estradiol (MINIVELLE) 0 1 MG/24HR, Place 1 patch on the skin 2 (two) times a week for 90 days, Disp: 24 patch, Rfl: 3    estradiol (VIVELLE-DOT) 0 1 MG/24HR, APPLY 1 PATCH ON THE SKIN TWICE A WEEK, Disp: 24 patch, Rfl: 4    Multiple Vitamin (MULTI-VITAMIN DAILY PO), Take 1 tablet by mouth daily, Disp: , Rfl:     Omega-3 Fatty Acids (FISH OIL) 1,000 mg, Take 1 capsule by mouth daily, Disp: , Rfl:     Probiotic Product (ACIDOPHILUS XTRA PO), Take 1 tablet by mouth daily, Disp: , Rfl:     No Known Allergies    Social History     Tobacco Use    Smoking status: Former Smoker    Smokeless tobacco: Never Used   Substance Use Topics    Alcohol use: Yes     Comment: social    Drug use: No         Review of Systems   Constitutional: Negative for activity change and fever  Eyes: Positive for redness  Objective:      /74   Pulse (!) 51   Temp 97 6 °F (36 4 °C)   Resp 16   Ht 5' 5 47" (1 663 m)   Wt 62 6 kg (138 lb)   SpO2 97%   BMI 22 63 kg/m²          Physical Exam   Constitutional: She is oriented to person, place, and time  She appears well-developed and well-nourished  No distress  HENT:   Head: Normocephalic and atraumatic  Eyes: Pupils are equal, round, and reactive to light  Conjunctivae and EOM are normal  Right eye exhibits no hordeolum  Left eye exhibits hordeolum  Neurological: She is alert and oriented to person, place, and time  Skin: She is not diaphoretic  Psychiatric: She has a normal mood and affect  Her behavior is normal  Judgment and thought content normal    Nursing note and vitals reviewed

## 2019-11-22 ENCOUNTER — TELEPHONE (OUTPATIENT)
Dept: GASTROENTEROLOGY | Facility: CLINIC | Age: 59
End: 2019-11-22

## 2019-11-22 DIAGNOSIS — Z12.11 SCREENING FOR MALIGNANT NEOPLASM OF COLON: ICD-10-CM

## 2019-11-22 DIAGNOSIS — Z86.010 HISTORY OF COLONIC POLYPS: Primary | ICD-10-CM

## 2019-11-22 NOTE — TELEPHONE ENCOUNTER
Pt scheduled for 2/3/20 with Dr Zulema Germain at [facility]  Instructions mailed to pt  Shobha Enamorado - pt has [facility] will need prior auth      Please send Suprep to pharmacy on file  Thank you!

## 2019-11-22 NOTE — TELEPHONE ENCOUNTER
Patients GI provider:  New patient    Number to return call: [phone]    Reason for call: Pt calling returning a missed call to schedule recall colonoscopy    Scheduled procedure/appointment date if applicable: Apt/procedure n/a

## 2019-11-22 NOTE — TELEPHONE ENCOUNTER
LVM for pt to return call to schedule REPEAT (recall) Colonoscopy with Dr Magdalene Nuno at [facility]   Pt is due

## 2019-11-22 NOTE — TELEPHONE ENCOUNTER
Diagnosis Information     Diagnosis   D12 6 (ICD-10-CM) - Adenomatous polyp of colon, unspecified part of colon   Referral Notes   Number of Notes: 2   Type Date User Summary Attachment   Specialty Comments 11/07/2019  2:57 PM Martha Kitchen 11/07/19 -   Note       11/07/19  Screened by: Martha Kitchen     Referring Provider      Pre- Screening: Body mass index is 22 63 kg/m²  Has patient been referred for a routine screening Colonoscopy? yes  Is the patient between 39-70 years old? yes     SCHEDULING STAFF  · If the patient is between 45yrs-49yrs, please advise patient to confirm benefits/coverage with their insurance company for a routine screening colonoscopy, some insurance carriers will only cover at [phone] or older  · If the patient is over 66years old, please schedule an office visit      Do you have any of the following symptoms? NO        Have you had a coronary or vascular stent within the last year? no     Have you had a heart attack or stroke in the last 6 months? no     Have you had intestinal surgery in the last 3 months? no     Do you have problems with: Sleep Apnea     Do you use: CPAP/BiPAP     Have you been hospitalized in the last Month? no     Have you been diagnosed with a bleeding disorder or anemia? no     Have you had chest pain (angina) or breathing problems  (COPD) in the last 3 months? no     Do you have any difficulty walking up a flight of stairs? no     Have you had Kidney failure or insufficiency? no     Have you had heart valve surgery? no     Are you confined to a wheelchair? no     Do you take Other blood thinning medications no     Have you been diagnosed with Diabetes or are you taking any   Diabetic medications? no     : If patient answers NO to medical questions, then schedule procedure    If patient answers YES to medical questions, then schedule office appointment      Previous Colonoscopy yes  Date and Facility of last colonoscopy?  7 YRS     Comments: PASSED OA , REQ BAL[facility] AT [facility]

## 2019-12-19 ENCOUNTER — OFFICE VISIT (OUTPATIENT)
Dept: FAMILY MEDICINE CLINIC | Facility: CLINIC | Age: 59
End: 2019-12-19
Payer: COMMERCIAL

## 2019-12-19 VITALS
TEMPERATURE: 98.2 F | DIASTOLIC BLOOD PRESSURE: 80 MMHG | OXYGEN SATURATION: 92 % | HEIGHT: 64 IN | SYSTOLIC BLOOD PRESSURE: 126 MMHG | HEART RATE: 61 BPM | BODY MASS INDEX: 22.84 KG/M2 | WEIGHT: 133.8 LBS | RESPIRATION RATE: 16 BRPM

## 2019-12-19 DIAGNOSIS — Z00.00 ANNUAL PHYSICAL EXAM: Primary | ICD-10-CM

## 2019-12-19 DIAGNOSIS — Z13.6 SCREENING FOR CARDIOVASCULAR CONDITION: ICD-10-CM

## 2019-12-19 DIAGNOSIS — R07.89 ATYPICAL CHEST PAIN: ICD-10-CM

## 2019-12-19 DIAGNOSIS — Z13.1 SCREENING FOR DIABETES MELLITUS: ICD-10-CM

## 2019-12-19 PROCEDURE — 99396 PREV VISIT EST AGE 40-64: CPT | Performed by: FAMILY MEDICINE

## 2019-12-19 PROCEDURE — 93000 ELECTROCARDIOGRAM COMPLETE: CPT | Performed by: FAMILY MEDICINE

## 2019-12-19 RX ORDER — NEOMYCIN SULFATE, POLYMYXIN B SULFATE AND DEXAMETHASONE 3.5; 10000; 1 MG/ML; [USP'U]/ML; MG/ML
SUSPENSION/ DROPS OPHTHALMIC
Refills: 1 | COMMUNITY
Start: 2019-10-11 | End: 2020-02-03

## 2019-12-19 NOTE — ASSESSMENT & PLAN NOTE
is stable on exam   He is to f/u PRN (a note was provided for school)  Precautions were given to the pt and his grandmother though - worsening pain, especially if SOB, worsening fevers, etc, he is to be taken to the ER  Suspect here non-cardiac source for his chest pain - likely some costochondritis and GERD, given the history given, and his exam   ECG today was reassuring - Sinus tach at 100bpm (pt has a low grade fever today), with normal axis; some early repolarization; no acute ST changes  Pt can use heat to the region on his chest, can take OTC NSAIDs with food PRN, Discussed taking OTC Zantac 150mg PO BID x 4 days, and also disccused dietary modifications for GERD (cutting back on soda, fatty foods, etc)

## 2019-12-19 NOTE — PROGRESS NOTES
[address]    NAME: Inez Davenport  AGE: 63 y o  SEX: female  : 1960     DATE: 2019     Assessment and Plan:       Problem List Items Addressed This Visit        Other    RESOLVED: Atypical chest pain    Relevant Orders    POCT ECG (Completed)      Other Visit Diagnoses     Annual physical exam    -  Primary    Hunter Oro is very healthy on exam   She is to continue a balanced diet, and regular exercise  FBW ordered  Screening for diabetes mellitus        Relevant Orders    Comprehensive metabolic panel    Screening for cardiovascular condition        Relevant Orders    Lipid Panel with Direct LDL reflex          Immunizations and preventive care screenings were discussed with patient today  Appropriate education was printed on patient's after visit summary  Counseling:  Dental Health: discussed importance of regular tooth brushing, flossing, and dental visits  · Exercise: the importance of regular exercise/physical activity was discussed  Recommend exercise 3-5 times per week for at least 30 minutes  Return in 1 year (on 2020)  Chief Complaint:     Chief Complaint   Patient presents with    Annual Exam     Patient here for annual wellness exam      History of Present Illness:     Adult Annual Physical   Patient here for a comprehensive physical exam  The patient reports no problems  Hunter Oro is exercising  Had some CP a couple weeks ago - had a gyro the night before  No SOB/VARMA, woke up with the pain the next AM, almost epigastric  Had GERD symptoms  Scheduled for colonoscopy in 2020  Mammogram normal in 2019  Atypical Chest Pain -  Pt stable on exam; resolved  Likely short-lived GERD - Discussed dietary lifestyle mods for GERD/ OTC Pepcid PRN  ECG normal today  Diet and Physical Activity  · Diet/Nutrition: well balanced diet  · Exercise: 3-4 times a week on average  Depression Screening  PHQ-9 Depression Screening    PHQ-9:    Frequency of the following problems over the past two weeks:            General Health  · Sleep: sleeps well  · Hearing: normal - bilateral   · Vision: no vision problems  · Dental: regular dental visits  /GYN Health  · Patient is: postmenopausal  · Last menstrual period: N/A   · Contraceptive method: None        Review of Systems:     Review of Systems   Constitutional: Negative for activity change and fever  HENT: Negative for congestion and rhinorrhea  Respiratory: Negative for shortness of breath  No VARMA  Cardiovascular: Negative for chest pain  Gastrointestinal: Negative for abdominal pain and blood in stool  No GERD symptoms now  Genitourinary: Negative for vaginal bleeding  No breast lumps on self-examination  Psychiatric/Behavioral: Negative for dysphoric mood  The patient is not nervous/anxious         Past Medical History:     Past Medical History:   Diagnosis Date    Conjunctival hemorrhage, right 2019    Ecchymoses, spontaneous     Last assessed 16    Edema     Last assessed 13    H/O total hysterectomy     Impetigo     last assessed 16    Left ear pain 2018      Past Surgical History:     Past Surgical History:   Procedure Laterality Date     SECTION      OOPHORECTOMY Bilateral     age 44   [facility] TOTAL ABDOMINAL HYSTERECTOMY      age 44      Social History:     Social History     Socioeconomic History    Marital status: /Civil Union     Spouse name: None    Number of children: None    Years of education: None    Highest education level: None   Occupational History    None   Social Needs    Financial resource strain: None    Food insecurity:     Worry: None     Inability: None    Transportation needs:     Medical: None     Non-medical: None   Tobacco Use    Smoking status: Former Smoker    Smokeless tobacco: Never Used   Substance and Sexual Activity    Alcohol use: Yes     Comment: social    Drug use: No    Sexual activity: Yes     Partners: Male   Lifestyle    Physical activity:     Days per week: None     Minutes per session: None    Stress: None   Relationships    Social connections:     Talks on phone: None     Gets together: None     Attends Yarsani service: None     Active member of club or organization: None     Attends meetings of clubs or organizations: None     Relationship status: None    Intimate partner violence:     Fear of current or ex partner: None     Emotionally abused: None     Physically abused: None     Forced sexual activity: None   Other Topics Concern    None   Social History Narrative    None      Family History:     Family History   Problem Relation Age of Onset    Heart attack Mother     Aneurysm Mother     COPD Mother     Coronary artery disease Mother     Diabetes Mother     COPD Father     Heart attack Father     Emphysema Father     Breast cancer Sister 62    Diabetes Paternal Grandmother     Stroke Paternal Grandfather     Breast cancer Paternal [de-identified]     Colon cancer Family     Crohn's disease Family     Liver cancer Family     Stroke Maternal Grandfather     COPD Sister     Emphysema Sister     Hypertension Sister     Hypertension Sister     No Known Problems Brother     No Known Problems Son     No Known Problems Son     No Known Problems Paternal Aunt     No Known Problems Paternal Aunt     No Known Problems Paternal Aunt       Current Medications:     Current Outpatient Medications   Medication Sig Dispense Refill    betamethasone dipropionate (DIPROSONE) 0 05 % cream Apply topically 2 (two) times a day      Calcium Carb-Cholecalciferol (CALTRATE 600+D) 600-800 MG-UNIT TABS Take 1 tablet by mouth daily      cholecalciferol (VITAMIN D3) 1,000 units tablet Take 1,000 Units by mouth daily      erythromycin (ILOTYCIN) ophthalmic ointment Administer 0 5 inches into the left eye every 6 (six) hours 3 5 g 0    estradiol (VIVELLE-DOT) 0 1 MG/24HR APPLY 1 PATCH ON THE SKIN TWICE A WEEK 24 patch 4    Multiple Vitamin (MULTI-VITAMIN DAILY PO) Take 1 tablet by mouth daily      Na Sulfate-K Sulfate-Mg Sulf (SUPREP BOWEL PREP KIT) 17 5-3 13-1 6 GM/177ML SOLN Take 1 kit by mouth once for 1 dose Per office instructions  2 Bottle 0    neomycin-polymyxin-dexamethasone (MAXITROL) ophthalmic suspension SHAKE LQ AND INT 1 GTT IN OS QID FOR 2 WKS  1    Omega-3 Fatty Acids (FISH OIL) 1,000 mg Take 1 capsule by mouth daily      Probiotic Product (ACIDOPHILUS XTRA PO) Take 1 tablet by mouth daily       No current facility-administered medications for this visit  Allergies:     No Known Allergies   Physical Exam:     /80   Pulse 61   Temp 98 2 °F (36 8 °C)   Resp 16   Ht 5' 4 09" (1 628 m)   Wt 60 7 kg (133 lb 12 8 oz)   SpO2 92%   BMI 22 90 kg/m²     Physical Exam   Constitutional: She is oriented to person, place, and time  She appears well-developed and well-nourished  No distress  HENT:   Head: Normocephalic and atraumatic  Eyes: Conjunctivae are normal    Neck: Normal range of motion  Neck supple  No thyromegaly present  Cardiovascular: Normal rate, regular rhythm and normal heart sounds  Exam reveals no gallop and no friction rub  No murmur heard  Pulmonary/Chest: Effort normal and breath sounds normal  No stridor  No respiratory distress  She has no wheezes  She has no rales  Abdominal: Soft  Bowel sounds are normal  She exhibits no distension and no mass  There is no tenderness  There is no rebound and no guarding  Lymphadenopathy:     She has no cervical adenopathy  Neurological: She is alert and oriented to person, place, and time  Skin: She is not diaphoretic  Psychiatric: She has a normal mood and affect  Her behavior is normal  Judgment and thought content normal    Nursing note and vitals reviewed        Jonah Carrera, [address] [place] [address],  [address] PRACTICE

## 2019-12-24 ENCOUNTER — OFFICE VISIT (OUTPATIENT)
Dept: FAMILY MEDICINE CLINIC | Facility: CLINIC | Age: 59
End: 2019-12-24
Payer: COMMERCIAL

## 2019-12-24 VITALS
SYSTOLIC BLOOD PRESSURE: 118 MMHG | OXYGEN SATURATION: 99 % | DIASTOLIC BLOOD PRESSURE: 72 MMHG | RESPIRATION RATE: 16 BRPM | HEART RATE: 80 BPM | BODY MASS INDEX: 22.73 KG/M2 | WEIGHT: 132.8 LBS | TEMPERATURE: 99.1 F

## 2019-12-24 DIAGNOSIS — J06.9 ACUTE UPPER RESPIRATORY INFECTION: Primary | ICD-10-CM

## 2019-12-24 LAB
FLUAV RNA NPH QL NAA+PROBE: NORMAL
FLUBV RNA NPH QL NAA+PROBE: NORMAL
RSV RNA NPH QL NAA+PROBE: NORMAL

## 2019-12-24 PROCEDURE — 99213 OFFICE O/P EST LOW 20 MIN: CPT | Performed by: NURSE PRACTITIONER

## 2019-12-24 PROCEDURE — 1036F TOBACCO NON-USER: CPT | Performed by: NURSE PRACTITIONER

## 2019-12-24 PROCEDURE — 87631 RESP VIRUS 3-5 TARGETS: CPT | Performed by: NURSE PRACTITIONER

## 2019-12-24 RX ORDER — AMOXICILLIN 875 MG/1
875 TABLET, COATED ORAL 2 TIMES DAILY
Qty: 20 TABLET | Refills: 0 | Status: SHIPPED | OUTPATIENT
Start: 2019-12-24 | End: 2020-01-03

## 2019-12-24 RX ORDER — GUAIFENESIN AND CODEINE PHOSPHATE 100; 10 MG/5ML; MG/5ML
5 SOLUTION ORAL 3 TIMES DAILY PRN
Qty: 120 ML | Refills: 0 | Status: SHIPPED | OUTPATIENT
Start: 2019-12-24 | End: 2020-02-03

## 2019-12-24 NOTE — PROGRESS NOTES
Assessment/Plan:           Problem List Items Addressed This Visit     None      Visit Diagnoses     Acute upper respiratory infection    -  Primary    Relevant Medications    amoxicillin (AMOXIL) 875 mg tablet    guaifenesin-codeine (GUAIFENESIN AC) 100-10 MG/5ML liquid    Other Relevant Orders    Influenza A/B and RSV PCR            Subjective:      Patient ID: Tommy Hopper is a 63 y o  female  Here for c/o illness  Started Friday am  Sore throat  Then progressed to sneezing, coughing, headache  No ear pain  Upset stomach  No fever known  Took nyquil and dayquil        The following portions of the patient's history were reviewed and updated as appropriate: allergies, current medications, past family history, past medical history, past social history, past surgical history and problem list     Review of Systems   Constitutional: Positive for fatigue  Negative for chills and fever  HENT: Positive for congestion, dental problem, postnasal drip, rhinorrhea and sore throat  Negative for ear pain and trouble swallowing  Respiratory: Positive for cough  Negative for shortness of breath and wheezing  Cardiovascular: Negative for chest pain and palpitations  Gastrointestinal: Negative for constipation  Musculoskeletal: Negative for arthralgias and myalgias  Neurological: Negative for dizziness, light-headedness and headaches  Hematological: Negative for adenopathy  Psychiatric/Behavioral: Negative for dysphoric mood  The patient is not nervous/anxious            Objective:      /72   Pulse 80   Temp 99 1 °F (37 3 °C)   Resp 16   Wt 60 2 kg (132 lb 12 8 oz)   SpO2 99%   BMI 22 73 kg/m²     Family History   Problem Relation Age of Onset    Heart attack Mother     Aneurysm Mother     COPD Mother     Coronary artery disease Mother     Diabetes Mother     COPD Father     Heart attack Father     Emphysema Father     Breast cancer Sister 62    Diabetes Paternal Grandmother     Stroke Paternal Grandfather     Breast cancer Paternal [de-identified]     Colon cancer Family     Crohn's disease Family     Liver cancer Family     Stroke Maternal Grandfather     COPD Sister    Darrell Bones Emphysema Sister     Hypertension Sister     Hypertension Sister     No Known Problems Brother     No Known Problems Son     No Known Problems Son     No Known Problems Paternal Aunt     No Known Problems Paternal Aunt     No Known Problems Paternal Aunt      Past Medical History:   Diagnosis Date    Conjunctival hemorrhage, right 9/17/2019    Ecchymoses, spontaneous     Last assessed 12/05/16    Edema     Last assessed 02/26/13    H/O total hysterectomy     Impetigo     last assessed 05/24/16    Left ear pain 8/23/2018     Social History     Socioeconomic History    Marital status: /Civil Union     Spouse name: Not on file    Number of children: Not on file    Years of education: Not on file    Highest education level: Not on file   Occupational History    Not on file   Social Needs    Financial resource strain: Not on file    Food insecurity:     Worry: Not on file     Inability: Not on file    Transportation needs:     Medical: Not on file     Non-medical: Not on file   Tobacco Use    Smoking status: Former Smoker    Smokeless tobacco: Never Used   Substance and Sexual Activity    Alcohol use: Yes     Comment: social    Drug use: No    Sexual activity: Yes     Partners: Male   Lifestyle    Physical activity:     Days per week: Not on file     Minutes per session: Not on file    Stress: Not on file   Relationships    Social connections:     Talks on phone: Not on file     Gets together: Not on file     Attends Holiness service: Not on file     Active member of club or organization: Not on file     Attends meetings of clubs or organizations: Not on file     Relationship status: Not on file    Intimate partner violence:     Fear of current or ex partner: Not on file     Emotionally abused: Not on file     Physically abused: Not on file     Forced sexual activity: Not on file   Other Topics Concern    Not on file   Social History Narrative    Not on file       Current Outpatient Medications:     betamethasone dipropionate (DIPROSONE) 0 05 % cream, Apply topically 2 (two) times a day, Disp: , Rfl:     Calcium Carb-Cholecalciferol (CALTRATE 600+D) 600-800 MG-UNIT TABS, Take 1 tablet by mouth daily, Disp: , Rfl:     cholecalciferol (VITAMIN D3) 1,000 units tablet, Take 1,000 Units by mouth daily, Disp: , Rfl:     erythromycin (ILOTYCIN) ophthalmic ointment, Administer 0 5 inches into the left eye every 6 (six) hours, Disp: 3 5 g, Rfl: 0    estradiol (VIVELLE-DOT) 0 1 MG/24HR, APPLY 1 PATCH ON THE SKIN TWICE A WEEK, Disp: 24 patch, Rfl: 4    Multiple Vitamin (MULTI-VITAMIN DAILY PO), Take 1 tablet by mouth daily, Disp: , Rfl:     Na Sulfate-K Sulfate-Mg Sulf (SUPREP BOWEL PREP KIT) 17 5-3 13-1 6 GM/177ML SOLN, Take 1 kit by mouth once for 1 dose Per office instructions  , Disp: 2 Bottle, Rfl: 0    neomycin-polymyxin-dexamethasone (MAXITROL) ophthalmic suspension, SHAKE LQ AND INT 1 GTT IN OS QID FOR 2 WKS, Disp: , Rfl: 1    Omega-3 Fatty Acids (FISH OIL) 1,000 mg, Take 1 capsule by mouth daily, Disp: , Rfl:     Probiotic Product (ACIDOPHILUS XTRA PO), Take 1 tablet by mouth daily, Disp: , Rfl:   No Known Allergies     Physical Exam   Constitutional: She is oriented to person, place, and time  She appears well-developed and well-nourished  HENT:   Head: Normocephalic  Right Ear: External ear normal    Left Ear: External ear normal    Nose: Mucosal edema and rhinorrhea present  Mouth/Throat: Uvula is midline and mucous membranes are normal  Posterior oropharyngeal erythema present  Eyes: Conjunctivae are normal    Neck: Normal range of motion  Neck supple  No thyromegaly present  Cardiovascular: Normal rate, regular rhythm and normal heart sounds     Pulmonary/Chest: Effort normal and breath sounds normal    Musculoskeletal: Normal range of motion  Neurological: She is alert and oriented to person, place, and time  Skin: Skin is warm and dry  Capillary refill takes less than 2 seconds  Psychiatric: She has a normal mood and affect  Her behavior is normal    Nursing note and vitals reviewed

## 2020-01-28 ENCOUNTER — TELEPHONE (OUTPATIENT)
Dept: GASTROENTEROLOGY | Facility: AMBULARY SURGERY CENTER | Age: 60
End: 2020-01-28

## 2020-01-28 NOTE — TELEPHONE ENCOUNTER
Patients GI provider:  Dr Emily Ramirez  Number to return call: ( [phone] ok to  leave     Reason for call: Pt calling to see if there is another prep she can take that is just one dose instead of two, not sure if she can tolerate drinking twice    Scheduled procedure/appointment date if applicable: Apt/procedure 2-3-20

## 2020-01-28 NOTE — TELEPHONE ENCOUNTER
Colonoscopy approved for 2/3/2020 w/ dr Kimberly Plummer at [facility]/auth # [id number] per Frank at [facility] /ref# for the call 1927/valid 2/3/2020 to 5/3/2020

## 2020-02-02 ENCOUNTER — ANESTHESIA EVENT (OUTPATIENT)
Dept: GASTROENTEROLOGY | Facility: HOSPITAL | Age: 60
End: 2020-02-02

## 2020-02-03 ENCOUNTER — HOSPITAL ENCOUNTER (OUTPATIENT)
Dept: GASTROENTEROLOGY | Facility: HOSPITAL | Age: 60
Setting detail: OUTPATIENT SURGERY
Discharge: HOME | End: 2020-02-03
Attending: INTERNAL MEDICINE | Admitting: INTERNAL MEDICINE
Payer: COMMERCIAL

## 2020-02-03 ENCOUNTER — ANESTHESIA (OUTPATIENT)
Dept: GASTROENTEROLOGY | Facility: HOSPITAL | Age: 60
End: 2020-02-03

## 2020-02-03 VITALS
SYSTOLIC BLOOD PRESSURE: 124 MMHG | HEIGHT: 64 IN | OXYGEN SATURATION: 99 % | BODY MASS INDEX: 22.53 KG/M2 | HEART RATE: 57 BPM | DIASTOLIC BLOOD PRESSURE: 58 MMHG | TEMPERATURE: 97.3 F | RESPIRATION RATE: 18 BRPM | WEIGHT: 132 LBS

## 2020-02-03 DIAGNOSIS — D12.6 ADENOMATOUS POLYP OF COLON, UNSPECIFIED PART OF COLON: ICD-10-CM

## 2020-02-03 PROCEDURE — G0105 COLORECTAL SCRN; HI RISK IND: HCPCS | Performed by: INTERNAL MEDICINE

## 2020-02-03 RX ORDER — SODIUM CHLORIDE, SODIUM LACTATE, POTASSIUM CHLORIDE, CALCIUM CHLORIDE 600; 310; 30; 20 MG/100ML; MG/100ML; MG/100ML; MG/100ML
125 INJECTION, SOLUTION INTRAVENOUS CONTINUOUS
Status: DISCONTINUED | OUTPATIENT
Start: 2020-02-03 | End: 2020-02-07 | Stop reason: HOSPADM

## 2020-02-03 RX ORDER — GLYCOPYRROLATE 0.2 MG/ML
INJECTION INTRAMUSCULAR; INTRAVENOUS AS NEEDED
Status: DISCONTINUED | OUTPATIENT
Start: 2020-02-03 | End: 2020-02-03 | Stop reason: SURG

## 2020-02-03 RX ORDER — PROPOFOL 10 MG/ML
INJECTION, EMULSION INTRAVENOUS CONTINUOUS PRN
Status: DISCONTINUED | OUTPATIENT
Start: 2020-02-03 | End: 2020-02-03 | Stop reason: SURG

## 2020-02-03 RX ORDER — SIMETHICONE 20 MG/.3ML
EMULSION ORAL CODE/TRAUMA/SEDATION MEDICATION
Status: COMPLETED | OUTPATIENT
Start: 2020-02-03 | End: 2020-02-03

## 2020-02-03 RX ORDER — PROPOFOL 10 MG/ML
INJECTION, EMULSION INTRAVENOUS AS NEEDED
Status: DISCONTINUED | OUTPATIENT
Start: 2020-02-03 | End: 2020-02-03 | Stop reason: SURG

## 2020-02-03 RX ORDER — LIDOCAINE HYDROCHLORIDE 10 MG/ML
0.5 INJECTION, SOLUTION EPIDURAL; INFILTRATION; INTRACAUDAL; PERINEURAL ONCE AS NEEDED
Status: DISCONTINUED | OUTPATIENT
Start: 2020-02-03 | End: 2020-02-07 | Stop reason: HOSPADM

## 2020-02-03 RX ADMIN — Medication 40 MG: at 11:58

## 2020-02-03 RX ADMIN — GLYCOPYRROLATE 0.2 MG: 0.2 INJECTION, SOLUTION INTRAMUSCULAR; INTRAVENOUS at 11:55

## 2020-02-03 RX ADMIN — SODIUM CHLORIDE, SODIUM LACTATE, POTASSIUM CHLORIDE, AND CALCIUM CHLORIDE: .6; .31; .03; .02 INJECTION, SOLUTION INTRAVENOUS at 11:48

## 2020-02-03 RX ADMIN — PROPOFOL 100 MCG/KG/MIN: 10 INJECTION, EMULSION INTRAVENOUS at 11:53

## 2020-02-03 RX ADMIN — PROPOFOL 20 MG: 10 INJECTION, EMULSION INTRAVENOUS at 11:57

## 2020-02-03 RX ADMIN — PROPOFOL 70 MG: 10 INJECTION, EMULSION INTRAVENOUS at 11:53

## 2020-02-03 NOTE — ANESTHESIA POSTPROCEDURE EVALUATION
Post-Op Assessment Note    CV Status:  Stable  Pain Score: 0    Pain management: adequate     Mental Status:  Alert and awake   Hydration Status:  Euvolemic   PONV Controlled:  Controlled   Airway Patency:  Patent   Post Op Vitals Reviewed: Yes      Staff: CRNA   Comments: VSS, SV          BP   95/54   Temp      Pulse  55   Resp   10   SpO2   99%

## 2020-02-03 NOTE — H&P
History and Physical - [facility]  Benson Clotilde 63 y o  female MRN: [id number]        HPI:  52-year-old female was referred for screening colonoscopy  Regular bowel movements and denies any blood or mucus in the stool    She was told about a small polyp on her previous colonoscopy about 7 years ago    Historical Information   Past Medical History:   Diagnosis Date    Conjunctival hemorrhage, right 2019    Ecchymoses, spontaneous     Last assessed 16    Edema     Last assessed 13    H/O total hysterectomy     Impetigo     last assessed 16    Left ear pain 2018     Past Surgical History:   Procedure Laterality Date     SECTION      OOPHORECTOMY Bilateral     age 44    TOTAL ABDOMINAL HYSTERECTOMY      age 44     Social History   Social History     Substance and Sexual Activity   Alcohol Use Yes    Comment: social     Social History     Substance and Sexual Activity   Drug Use No     Social History     Tobacco Use   Smoking Status Former Smoker   Smokeless Tobacco Never Used     Family History   Problem Relation Age of Onset    Heart attack Mother     Aneurysm Mother     COPD Mother     Coronary artery disease Mother     Diabetes Mother     COPD Father     Heart attack Father     Emphysema Father     Breast cancer Sister 62    Diabetes Paternal Grandmother     Stroke Paternal Grandfather     Breast cancer Paternal [de-identified]     Colon cancer Family     Crohn's disease Family     Liver cancer Family     Stroke Maternal Grandfather     COPD Sister     Emphysema Sister     Hypertension Sister     Hypertension Sister     No Known Problems Brother     No Known Problems Son     No Known Problems Son     No Known Problems Paternal Aunt     No Known Problems Paternal Aunt     No Known Problems Paternal Aunt        Meds/Allergies       (Not in a hospital admission)    No Known Allergies    Objective     Blood pressure 125/58, pulse 61, temperature 98 4 °F (36 9 °C), temperature source Temporal, resp  rate 20, height 5' 4" (1 626 m), weight 59 9 kg (132 lb), SpO2 100 %      PHYSICAL EXAM:    Gen: NAD  CV: S1 & S2 normal, RRR  CHEST: Clear to auscultate  ABD: soft, NT/ND, good bowel sounds  EXT: no edema    ASSESSMENT:     Screening for colon cancer/ history of colon polyps    PLAN:    Colonoscopy

## 2020-02-03 NOTE — ANESTHESIA PREPROCEDURE EVALUATION
Review of Systems/Medical History  Patient summary reviewed  Chart reviewed  No history of anesthetic complications     Cardiovascular  Negative cardio ROS    Pulmonary  Negative pulmonary ROS        GI/Hepatic    Bowel prep       Negative  ROS        Endo/Other  Negative endo/other ROS      GYN    Hysterectomy,        Hematology  Negative hematology ROS      Musculoskeletal  Negative musculoskeletal ROS        Neurology  Negative neurology ROS      Psychology   Negative psychology ROS              Physical Exam    Airway    Mallampati score: I  TM Distance: >3 FB  Neck ROM: full     Dental   No notable dental hx     Cardiovascular  Comment: Negative ROS,     Pulmonary      Other Findings        Anesthesia Plan  ASA Score- 1     Anesthesia Type- IV sedation with anesthesia with ASA Monitors  Additional Monitors:   Airway Plan:         Plan Factors-  Patient did not smoke on day of surgery  Induction-     Postoperative Plan-     Informed Consent- Anesthetic plan and risks discussed with patient  I personally reviewed this patient with the CRNA  Discussed and agreed on the Anesthesia Plan with the CRNA  Mitzi Reynaga

## 2020-03-10 ENCOUNTER — LAB (OUTPATIENT)
Dept: LAB | Facility: CLINIC | Age: 60
End: 2020-03-10
Payer: COMMERCIAL

## 2020-03-10 DIAGNOSIS — Z13.1 SCREENING FOR DIABETES MELLITUS: ICD-10-CM

## 2020-03-10 DIAGNOSIS — Z13.6 SCREENING FOR CARDIOVASCULAR CONDITION: ICD-10-CM

## 2020-03-10 LAB
ALBUMIN SERPL BCP-MCNC: 3.5 G/DL (ref 3.5–5)
ALP SERPL-CCNC: 56 U/L (ref 46–116)
ALT SERPL W P-5'-P-CCNC: 24 U/L (ref 12–78)
ANION GAP SERPL CALCULATED.3IONS-SCNC: 4 MMOL/L (ref 4–13)
AST SERPL W P-5'-P-CCNC: 16 U/L (ref 5–45)
BILIRUB SERPL-MCNC: 0.24 MG/DL (ref 0.2–1)
BUN SERPL-MCNC: 15 MG/DL (ref 5–25)
CALCIUM SERPL-MCNC: 8.7 MG/DL (ref 8.3–10.1)
CHLORIDE SERPL-SCNC: 109 MMOL/L (ref 100–108)
CHOLEST SERPL-MCNC: 153 MG/DL (ref 50–200)
CO2 SERPL-SCNC: 30 MMOL/L (ref 21–32)
CREAT SERPL-MCNC: 0.52 MG/DL (ref 0.6–1.3)
GFR SERPL CREATININE-BSD FRML MDRD: 106 ML/MIN/1.73SQ M
GLUCOSE P FAST SERPL-MCNC: 86 MG/DL (ref 65–99)
HDLC SERPL-MCNC: 72 MG/DL
LDLC SERPL CALC-MCNC: 69 MG/DL (ref 0–100)
POTASSIUM SERPL-SCNC: 4.1 MMOL/L (ref 3.5–5.3)
PROT SERPL-MCNC: 6.3 G/DL (ref 6.4–8.2)
SODIUM SERPL-SCNC: 143 MMOL/L (ref 136–145)
TRIGL SERPL-MCNC: 60 MG/DL

## 2020-03-10 PROCEDURE — 36415 COLL VENOUS BLD VENIPUNCTURE: CPT

## 2020-03-10 PROCEDURE — 80061 LIPID PANEL: CPT

## 2020-03-10 PROCEDURE — 80053 COMPREHEN METABOLIC PANEL: CPT

## 2020-07-16 ENCOUNTER — OFFICE VISIT (OUTPATIENT)
Dept: FAMILY MEDICINE CLINIC | Facility: CLINIC | Age: 60
End: 2020-07-16
Payer: COMMERCIAL

## 2020-07-16 VITALS
SYSTOLIC BLOOD PRESSURE: 140 MMHG | TEMPERATURE: 98.1 F | RESPIRATION RATE: 14 BRPM | OXYGEN SATURATION: 99 % | HEIGHT: 64 IN | BODY MASS INDEX: 23.93 KG/M2 | HEART RATE: 62 BPM | WEIGHT: 140.2 LBS | DIASTOLIC BLOOD PRESSURE: 88 MMHG

## 2020-07-16 DIAGNOSIS — R20.0 NUMBNESS AND TINGLING IN LEFT HAND: Primary | ICD-10-CM

## 2020-07-16 DIAGNOSIS — R20.2 NUMBNESS AND TINGLING IN LEFT HAND: Primary | ICD-10-CM

## 2020-07-16 DIAGNOSIS — R20.0 LEFT ARM NUMBNESS: ICD-10-CM

## 2020-07-16 DIAGNOSIS — Z12.39 SCREENING FOR BREAST CANCER: ICD-10-CM

## 2020-07-16 PROCEDURE — 3008F BODY MASS INDEX DOCD: CPT | Performed by: FAMILY MEDICINE

## 2020-07-16 PROCEDURE — 99213 OFFICE O/P EST LOW 20 MIN: CPT | Performed by: FAMILY MEDICINE

## 2020-07-16 PROCEDURE — 1036F TOBACCO NON-USER: CPT | Performed by: FAMILY MEDICINE

## 2020-07-16 NOTE — PROGRESS NOTES
Assessment/Plan:    1  Numbness and tingling in left hand  Assessment & Plan:  Poss carpel tunnel  Refer to hand and for emg    Orders:  -     EMG 1 Limb; Future  -     Ambulatory referral to Orthopedic Surgery; Future    2  Left arm numbness  -     EMG 1 Limb; Future  -     Ambulatory referral to Orthopedic Surgery; Future    3  Screening for breast cancer  -     Mammo screening bilateral w 3d & cad; Future; Expected date: 07/16/2020         There are no Patient Instructions on file for this visit  No follow-ups on file  Subjective:      Patient ID: Monse Brock is a 63 y o  female  Chief Complaint   Patient presents with    Numbness     Patient here with left arm numbness and tingling some times it feels heavy hurts at the wrist when bends it        Started in left forearm for couple months  During day while using arm  Wrist pain  Numbness tingling in forearm, from shoulder to wrist  Wrist has pain  No shoulder or neck pain  No chest pain  Tingling  Typing for 30 year  No meds  Not keeping her up at night  No pain at night    Arm Pain    The incident occurred more than 1 week ago  There was no injury mechanism  The pain is present in the left forearm and left wrist  The pain has been constant since the incident  Associated symptoms include numbness and tingling  She has tried nothing for the symptoms  The treatment provided mild relief  The following portions of the patient's history were reviewed and updated as appropriate:  past social history    Review of Systems   Constitutional: Negative  HENT: Negative  Eyes: Negative  Respiratory: Negative  Cardiovascular: Negative  Gastrointestinal: Negative  Endocrine: Negative  Genitourinary: Negative  Musculoskeletal: Negative  Skin: Negative  Allergic/Immunologic: Negative  Neurological: Positive for tingling and numbness  Hematological: Negative  Psychiatric/Behavioral: Negative            Current Outpatient Medications   Medication Sig Dispense Refill    Calcium Carb-Cholecalciferol (CALTRATE 600+D) 600-800 MG-UNIT TABS Take 1 tablet by mouth daily      cholecalciferol (VITAMIN D3) 1,000 units tablet Take 1,000 Units by mouth daily      estradiol (VIVELLE-DOT) 0 1 MG/24HR APPLY 1 PATCH ON THE SKIN TWICE A WEEK 24 patch 4    Multiple Vitamin (MULTI-VITAMIN DAILY PO) Take 1 tablet by mouth daily      Omega-3 Fatty Acids (FISH OIL) 1,000 mg Take 1 capsule by mouth daily      Probiotic Product (ACIDOPHILUS XTRA PO) Take 1 tablet by mouth daily       No current facility-administered medications for this visit  Objective:    /88   Pulse 62   Temp 98 1 °F (36 7 °C)   Resp 14   Ht 5' 4" (1 626 m)   Wt 63 6 kg (140 lb 3 2 oz)   SpO2 99%   BMI 24 07 kg/m²      Physical Exam   Constitutional: She is oriented to person, place, and time  She appears well-developed and well-nourished  HENT:   Head: Normocephalic and atraumatic  Eyes: Pupils are equal, round, and reactive to light  EOM are normal    Neck: Normal range of motion  Musculoskeletal:        Right shoulder: She exhibits decreased strength (hand dec)  Neurological: She is alert and oriented to person, place, and time  A sensory deficit is present  She exhibits abnormal muscle tone  Nursing note and vitals reviewed            Lloyd Cosby DO

## 2020-08-20 ENCOUNTER — HOSPITAL ENCOUNTER (OUTPATIENT)
Dept: NEUROLOGY | Facility: CLINIC | Age: 60
Discharge: HOME | End: 2020-08-20
Payer: COMMERCIAL

## 2020-08-20 DIAGNOSIS — R20.2 NUMBNESS AND TINGLING IN LEFT HAND: ICD-10-CM

## 2020-08-20 DIAGNOSIS — R20.0 LEFT ARM NUMBNESS: ICD-10-CM

## 2020-08-20 DIAGNOSIS — R20.0 NUMBNESS AND TINGLING IN LEFT HAND: ICD-10-CM

## 2020-08-20 PROCEDURE — 95886 MUSC TEST DONE W/N TEST COMP: CPT | Performed by: PSYCHIATRY & NEUROLOGY

## 2020-08-20 PROCEDURE — 95909 NRV CNDJ TST 5-6 STUDIES: CPT | Performed by: PSYCHIATRY & NEUROLOGY

## 2020-11-24 ENCOUNTER — TELEMEDICINE (OUTPATIENT)
Dept: FAMILY MEDICINE CLINIC | Facility: CLINIC | Age: 60
End: 2020-11-24
Payer: COMMERCIAL

## 2020-11-24 DIAGNOSIS — J01.00 ACUTE NON-RECURRENT MAXILLARY SINUSITIS: ICD-10-CM

## 2020-11-24 DIAGNOSIS — Z20.822 EXPOSURE TO COVID-19 VIRUS: Primary | ICD-10-CM

## 2020-11-24 DIAGNOSIS — Z79.890 HORMONE REPLACEMENT THERAPY (HRT): ICD-10-CM

## 2020-11-24 DIAGNOSIS — Z20.822 EXPOSURE TO COVID-19 VIRUS: ICD-10-CM

## 2020-11-24 PROCEDURE — 99213 OFFICE O/P EST LOW 20 MIN: CPT | Performed by: FAMILY MEDICINE

## 2020-11-24 PROCEDURE — U0003 INFECTIOUS AGENT DETECTION BY NUCLEIC ACID (DNA OR RNA); SEVERE ACUTE RESPIRATORY SYNDROME CORONAVIRUS 2 (SARS-COV-2) (CORONAVIRUS DISEASE [COVID-19]), AMPLIFIED PROBE TECHNIQUE, MAKING USE OF HIGH THROUGHPUT TECHNOLOGIES AS DESCRIBED BY CMS-2020-01-R: HCPCS | Performed by: FAMILY MEDICINE

## 2020-11-24 RX ORDER — ESTRADIOL 0.1 MG/D
FILM, EXTENDED RELEASE TRANSDERMAL
Qty: 24 PATCH | Refills: 3 | OUTPATIENT
Start: 2020-11-24

## 2020-11-24 RX ORDER — ESTRADIOL 0.1 MG/D
FILM, EXTENDED RELEASE TRANSDERMAL
Qty: 24 PATCH | Refills: 3 | Status: SHIPPED | OUTPATIENT
Start: 2020-11-24 | End: 2021-07-22 | Stop reason: SDUPTHER

## 2020-11-25 LAB — SARS-COV-2 RNA SPEC QL NAA+PROBE: NOT DETECTED

## 2020-11-26 DIAGNOSIS — J01.00 ACUTE NON-RECURRENT MAXILLARY SINUSITIS: Primary | ICD-10-CM

## 2020-11-26 RX ORDER — AMOXICILLIN AND CLAVULANATE POTASSIUM 875; 125 MG/1; MG/1
1 TABLET, FILM COATED ORAL EVERY 12 HOURS SCHEDULED
Qty: 20 TABLET | Refills: 0 | Status: SHIPPED | OUTPATIENT
Start: 2020-11-26 | End: 2020-12-06

## 2020-12-29 ENCOUNTER — HOSPITAL ENCOUNTER (OUTPATIENT)
Dept: RADIOLOGY | Age: 60
Discharge: HOME | End: 2020-12-29
Payer: COMMERCIAL

## 2020-12-29 VITALS — BODY MASS INDEX: 23.9 KG/M2 | WEIGHT: 140 LBS | HEIGHT: 64 IN

## 2020-12-29 DIAGNOSIS — Z12.31 ENCOUNTER FOR SCREENING MAMMOGRAM FOR MALIGNANT NEOPLASM OF BREAST: ICD-10-CM

## 2020-12-29 DIAGNOSIS — Z12.39 SCREENING FOR BREAST CANCER: ICD-10-CM

## 2020-12-29 PROCEDURE — 77063 BREAST TOMOSYNTHESIS BI: CPT

## 2020-12-29 PROCEDURE — 77067 SCR MAMMO BI INCL CAD: CPT

## 2021-02-08 ENCOUNTER — TELEPHONE (OUTPATIENT)
Dept: FAMILY MEDICINE CLINIC | Facility: CLINIC | Age: 61
End: 2021-02-08

## 2021-02-08 DIAGNOSIS — Z20.822 EXPOSURE TO COVID-19 VIRUS: Primary | ICD-10-CM

## 2021-02-08 LAB — SARS-COV-2 RNA RESP QL NAA+PROBE: NEGATIVE

## 2021-02-08 PROCEDURE — 87635 SARS-COV-2 COVID-19 AMP PRB: CPT | Performed by: FAMILY MEDICINE

## 2021-02-08 NOTE — TELEPHONE ENCOUNTER
Pt called she has not exposure but is having headaches and is asking to be tested her family member has cancer and visitors are coming and she wants to make sure it is not covid so she dont infect others pls advise if ok

## 2021-04-13 DIAGNOSIS — Z23 ENCOUNTER FOR IMMUNIZATION: ICD-10-CM

## 2021-04-20 ENCOUNTER — OFFICE VISIT (OUTPATIENT)
Dept: FAMILY MEDICINE CLINIC | Facility: CLINIC | Age: 61
End: 2021-04-20
Payer: COMMERCIAL

## 2021-04-20 VITALS
WEIGHT: 140.6 LBS | OXYGEN SATURATION: 100 % | DIASTOLIC BLOOD PRESSURE: 70 MMHG | SYSTOLIC BLOOD PRESSURE: 124 MMHG | RESPIRATION RATE: 16 BRPM | HEIGHT: 65 IN | BODY MASS INDEX: 23.43 KG/M2 | HEART RATE: 50 BPM

## 2021-04-20 DIAGNOSIS — Z00.00 ANNUAL PHYSICAL EXAM: Primary | ICD-10-CM

## 2021-04-20 PROBLEM — H00.025 HORDEOLUM INTERNUM OF LEFT LOWER EYELID: Status: RESOLVED | Noted: 2019-09-30 | Resolved: 2021-04-20

## 2021-04-20 PROBLEM — Z20.822 EXPOSURE TO COVID-19 VIRUS: Status: RESOLVED | Noted: 2020-11-24 | Resolved: 2021-04-20

## 2021-04-20 PROBLEM — R20.0 LEFT ARM NUMBNESS: Status: RESOLVED | Noted: 2020-07-16 | Resolved: 2021-04-20

## 2021-04-20 PROBLEM — J01.00 ACUTE NON-RECURRENT MAXILLARY SINUSITIS: Status: RESOLVED | Noted: 2020-11-24 | Resolved: 2021-04-20

## 2021-04-20 PROBLEM — R20.2 NUMBNESS AND TINGLING IN LEFT HAND: Status: RESOLVED | Noted: 2020-07-16 | Resolved: 2021-04-20

## 2021-04-20 PROBLEM — R20.0 NUMBNESS AND TINGLING IN LEFT HAND: Status: RESOLVED | Noted: 2020-07-16 | Resolved: 2021-04-20

## 2021-04-20 PROCEDURE — 99396 PREV VISIT EST AGE 40-64: CPT | Performed by: FAMILY MEDICINE

## 2021-04-20 PROCEDURE — 3008F BODY MASS INDEX DOCD: CPT | Performed by: FAMILY MEDICINE

## 2021-04-20 PROCEDURE — 3725F SCREEN DEPRESSION PERFORMED: CPT | Performed by: FAMILY MEDICINE

## 2021-04-20 NOTE — PATIENT INSTRUCTIONS

## 2021-04-20 NOTE — PROGRESS NOTES
[address]    NAME: Duglas Davenport  AGE: 62 y o  SEX: female  : 1960     DATE: 2021     Assessment and Plan:     Problem List Items Addressed This Visit        Other    Annual physical exam - Primary     Mammogram up to date  covid vaccine scheduled  Labs last year normal               Immunizations and preventive care screenings were discussed with patient today  Appropriate education was printed on patient's after visit summary  Counseling:  Dental Health: discussed importance of regular tooth brushing, flossing, and dental visits  · Exercise: the importance of regular exercise/physical activity was discussed  Recommend exercise 3-5 times per week for at least 30 minutes  Return in 1 year (on 2022)  Chief Complaint:     Chief Complaint   Patient presents with    Annual Exam      History of Present Illness:     Adult Annual Physical   Patient here for a comprehensive physical exam  The patient reports no problems  Diet and Physical Activity  · Diet/Nutrition: well balanced diet  · Exercise: 3-4 times a week on average  Depression Screening  PHQ-9 Depression Screening    PHQ-9:   Frequency of the following problems over the past two weeks:      Little interest or pleasure in doing things: 0 - not at all  Feeling down, depressed, or hopeless: 0 - not at all  PHQ-2 Score: 0       General Health  · Sleep: sleeps well  · Hearing: normal - bilateral   · Vision: no vision problems  · Dental: regular dental visits  /GYN Health  · Patient is: postmenopausal  · Last menstrual period: hysterectomy  · Contraceptive method: n/a  Review of Systems:     Review of Systems   Constitutional: Negative  HENT: Negative  Eyes: Negative  Respiratory: Negative  Cardiovascular: Negative  Gastrointestinal: Negative  Endocrine: Negative  Genitourinary: Negative  Musculoskeletal: Negative  Skin: Negative  Allergic/Immunologic: Negative  Neurological: Negative  Hematological: Negative  Psychiatric/Behavioral: Negative         Past Medical History:     Past Medical History:   Diagnosis Date    Conjunctival hemorrhage, right 2019    Ecchymoses, spontaneous     Last assessed 16    Edema     Last assessed 13    H/O total hysterectomy     Impetigo     last assessed 16    Left ear pain 2018      Past Surgical History:     Past Surgical History:   Procedure Laterality Date     SECTION      OOPHORECTOMY Bilateral     age 44   [facility] TOTAL ABDOMINAL HYSTERECTOMY      age 44      Social History:        Social History     Socioeconomic History    Marital status: /Civil Union     Spouse name: None    Number of children: None    Years of education: None    Highest education level: None   Occupational History    None   Social Needs    Financial resource strain: None    Food insecurity     Worry: None     Inability: None    Transportation needs     Medical: None     Non-medical: None   Tobacco Use    Smoking status: Former Smoker    Smokeless tobacco: Never Used   Substance and Sexual Activity    Alcohol use: Yes     Comment: social    Drug use: No    Sexual activity: Yes     Partners: Male   Lifestyle    Physical activity     Days per week: None     Minutes per session: None    Stress: None   Relationships    Social connections     Talks on phone: None     Gets together: None     Attends Rastafarian service: None     Active member of club or organization: None     Attends meetings of clubs or organizations: None     Relationship status: None    Intimate partner violence     Fear of current or ex partner: None     Emotionally abused: None     Physically abused: None     Forced sexual activity: None   Other Topics Concern    None   Social History Narrative    None      Family History:     Family History   Problem Relation Age of Onset    Heart attack Mother     Aneurysm Mother     COPD Mother     Coronary artery disease Mother     Diabetes Mother     COPD Father     Heart attack Father     Emphysema Father     Breast cancer Sister 62    Diabetes Paternal Grandmother     Stroke Paternal Grandfather     Breast cancer Paternal [de-identified]         unknown age   Belia Bruce Crohn's disease Family     Breast cancer Family     Lung cancer Family     Stroke Maternal Grandfather     COPD Sister     Emphysema Sister     Hypertension Sister     Hypertension Sister     Lung cancer Sister     No Known Problems Brother     No Known Problems Son     No Known Problems Son     No Known Problems Paternal Aunt     No Known Problems Paternal Aunt     No Known Problems Paternal Aunt     Lung cancer Paternal Uncle         unknown age      Current Medications:     Current Outpatient Medications   Medication Sig Dispense Refill    Calcium Carb-Cholecalciferol (CALTRATE 600+D) 600-800 MG-UNIT TABS Take 1 tablet by mouth daily      cholecalciferol (VITAMIN D3) 1,000 units tablet Take 1,000 Units by mouth daily      estradiol (VIVELLE-DOT) 0 1 MG/24HR APPLY 1 PATCH ON THE SKIN TWICE A WEEK 24 patch 3    Multiple Vitamin (MULTI-VITAMIN DAILY PO) Take 1 tablet by mouth daily      Omega-3 Fatty Acids (FISH OIL) 1,000 mg Take 1 capsule by mouth daily      Probiotic Product (ACIDOPHILUS XTRA PO) Take 1 tablet by mouth daily       No current facility-administered medications for this visit  Allergies:     No Known Allergies   Physical Exam:     /70   Pulse (!) 50   Resp 16   Ht 5' 4 69" (1 643 m)   Wt 63 8 kg (140 lb 9 6 oz)   SpO2 100%   BMI 23 63 kg/m²     Physical Exam  Vitals signs and nursing note reviewed  Constitutional:       Appearance: Normal appearance  She is well-developed  HENT:      Head: Normocephalic and atraumatic        Right Ear: Tympanic membrane, ear canal and external ear normal       Left Ear: Tympanic membrane, ear canal and external ear normal       Nose: Nose normal    Eyes:      Extraocular Movements: Extraocular movements intact  Conjunctiva/sclera: Conjunctivae normal       Pupils: Pupils are equal, round, and reactive to light  Neck:      Musculoskeletal: Normal range of motion and neck supple  Cardiovascular:      Rate and Rhythm: Normal rate and regular rhythm  Pulses: Normal pulses  Heart sounds: Normal heart sounds  Pulmonary:      Effort: Pulmonary effort is normal       Breath sounds: Normal breath sounds  Abdominal:      General: Bowel sounds are normal       Palpations: Abdomen is soft  Musculoskeletal: Normal range of motion  Skin:     General: Skin is warm and dry  Capillary Refill: Capillary refill takes less than 2 seconds  Neurological:      General: No focal deficit present  Mental Status: She is alert and oriented to person, place, and time  Psychiatric:         Mood and Affect: Mood normal          Behavior: Behavior normal          Thought Content:  Thought content normal          Judgment: Judgment normal           Macy Antonio DO  [address] [facility]

## 2021-04-23 ENCOUNTER — TELEPHONE (OUTPATIENT)
Dept: FAMILY MEDICINE CLINIC | Facility: CLINIC | Age: 61
End: 2021-04-23

## 2021-04-23 DIAGNOSIS — B34.9 VIRAL INFECTION, UNSPECIFIED: Primary | ICD-10-CM

## 2021-04-23 DIAGNOSIS — B34.9 VIRAL INFECTION, UNSPECIFIED: ICD-10-CM

## 2021-04-23 LAB — SARS-COV-2 RNA RESP QL NAA+PROBE: NEGATIVE

## 2021-04-23 PROCEDURE — U0005 INFEC AGEN DETEC AMPLI PROBE: HCPCS | Performed by: FAMILY MEDICINE

## 2021-04-23 PROCEDURE — U0003 INFECTIOUS AGENT DETECTION BY NUCLEIC ACID (DNA OR RNA); SEVERE ACUTE RESPIRATORY SYNDROME CORONAVIRUS 2 (SARS-COV-2) (CORONAVIRUS DISEASE [COVID-19]), AMPLIFIED PROBE TECHNIQUE, MAKING USE OF HIGH THROUGHPUT TECHNOLOGIES AS DESCRIBED BY CMS-2020-01-R: HCPCS | Performed by: FAMILY MEDICINE

## 2021-04-23 NOTE — TELEPHONE ENCOUNTER
Patient calls in reporting nasal congestion for 3 days  She has started sneezing, runny nose, post nasal drip, and a headache  She denies having fever sore throat diarrhea nausea or vomiting  Her  had similar symptoms over the weekend through the beginning of this week  She would like to know if she has COVID as she is scheduled for her COVID vaccine next week and would like to know in advance if she should cancel  Patient notified Dr Stevo Curran is not in office and may have to wait for a response, would like message sent to covering provider

## 2021-04-27 ENCOUNTER — OFFICE VISIT (OUTPATIENT)
Dept: FAMILY MEDICINE CLINIC | Facility: CLINIC | Age: 61
End: 2021-04-27
Payer: COMMERCIAL

## 2021-04-27 ENCOUNTER — IMMUNIZATIONS (OUTPATIENT)
Dept: FAMILY MEDICINE CLINIC | Facility: HOSPITAL | Age: 61
End: 2021-04-27

## 2021-04-27 ENCOUNTER — TELEPHONE (OUTPATIENT)
Dept: FAMILY MEDICINE CLINIC | Facility: CLINIC | Age: 61
End: 2021-04-27

## 2021-04-27 VITALS
TEMPERATURE: 96.8 F | DIASTOLIC BLOOD PRESSURE: 72 MMHG | HEART RATE: 59 BPM | BODY MASS INDEX: 23.76 KG/M2 | WEIGHT: 141.4 LBS | RESPIRATION RATE: 16 BRPM | SYSTOLIC BLOOD PRESSURE: 130 MMHG | OXYGEN SATURATION: 97 %

## 2021-04-27 DIAGNOSIS — B00.1 HERPES LABIALIS: Primary | ICD-10-CM

## 2021-04-27 DIAGNOSIS — Z23 ENCOUNTER FOR IMMUNIZATION: Primary | ICD-10-CM

## 2021-04-27 DIAGNOSIS — L01.00 IMPETIGO: ICD-10-CM

## 2021-04-27 PROCEDURE — 1036F TOBACCO NON-USER: CPT | Performed by: FAMILY MEDICINE

## 2021-04-27 PROCEDURE — 99213 OFFICE O/P EST LOW 20 MIN: CPT | Performed by: FAMILY MEDICINE

## 2021-04-27 PROCEDURE — 91300 SARS-COV-2 / COVID-19 MRNA VACCINE (PFIZER-BIONTECH) 30 MCG: CPT

## 2021-04-27 PROCEDURE — 0001A SARS-COV-2 / COVID-19 MRNA VACCINE (PFIZER-BIONTECH) 30 MCG: CPT

## 2021-04-27 RX ORDER — VALACYCLOVIR HYDROCHLORIDE 1 G/1
2000 TABLET, FILM COATED ORAL 2 TIMES DAILY
Qty: 4 TABLET | Refills: 0 | Status: SHIPPED | OUTPATIENT
Start: 2021-04-27 | End: 2022-03-21

## 2021-04-27 NOTE — PROGRESS NOTES
FAMILY PRACTICE OFFICE VISIT       NAME: Celestina Davenport  AGE: 62 y o  SEX: female       : 1960        MRN: [id number]    DATE: 2021  TIME: 9:54 AM    Assessment and Plan   1  Herpes labialis  Comments:  Likely by exam; pt stable  Will treat with Valacyclovir x 1 day  Disc with pt - safe for her to get her first COV-19 vaccine tonight as planned  Orders:  -     valACYclovir (VALTREX) 1,000 mg tablet; Take 2 tablets (2,000 mg total) by mouth 2 (two) times a day for 1 day    2  Impetigo  Comments:  Possible by exam - will treat topically to region with Mupirocin ointment  Precautions given  Orders:  -     mupirocin (BACTROBAN) 2 % ointment; Apply topically 3 (three) times a day for 7 days         There are no Patient Instructions on file for this visit  Chief Complaint     Chief Complaint   Patient presents with    Follow-up     patient has boils under nose       History of Present Illness   Destiney Warren is a 62y o -year-old female who c/o raw spots / "boils" below her nose x 4 days  She and her  struggle with seasonal allgies, and wiping noses a lot  No fevers  Still some residual post-nasal drip  Pt had a negative COV-19 PCR test 21  Review of Systems   Review of Systems   Constitutional: Negative for activity change and fever  HENT: Positive for postnasal drip and rhinorrhea  Skin: Positive for rash         Active Problem List     Patient Active Problem List   Diagnosis    Adenomatous colon polyp    Well adult exam    Screening for breast cancer    Annual physical exam         Past Medical History:  Past Medical History:   Diagnosis Date    Conjunctival hemorrhage, right 2019    Ecchymoses, spontaneous     Last assessed 16    Edema     Last assessed 13    H/O total hysterectomy     Impetigo     last assessed 16    Left ear pain 2018       Past Surgical History:  Past Surgical History:   Procedure Laterality Date     SECTION      OOPHORECTOMY Bilateral     age 44    TOTAL ABDOMINAL HYSTERECTOMY      age 44       Family History:  Family History   Problem Relation Age of Onset    Heart attack Mother     Aneurysm Mother     COPD Mother     Coronary artery disease Mother     Diabetes Mother     COPD Father     Heart attack Father     Emphysema Father     Breast cancer Sister 62    Diabetes Paternal Grandmother     Stroke Paternal Grandfather     Breast cancer Paternal [de-identified]         unknown age   Wash [employer]er Crohn's disease Family     Breast cancer Family     Lung cancer Family     Stroke Maternal Grandfather     COPD Sister    [employer] Emphysema Sister     Hypertension Sister     Hypertension Sister     Lung cancer Sister     No Known Problems Brother     No Known Problems Son     No Known Problems Son     No Known Problems Paternal Aunt     No Known Problems Paternal Aunt     No Known Problems Paternal Aunt     Lung cancer Paternal Uncle         unknown age       Social History:  Social History     Socioeconomic History    Marital status: /Civil Union     Spouse name: Not on file    Number of children: Not on file    Years of education: Not on file    Highest education level: Not on file   Occupational History    Not on file   Social Needs    Financial resource strain: Not on file    Food insecurity     Worry: Not on file     Inability: Not on file   [employer] needs     Medical: Not on file     Non-medical: Not on file   Tobacco Use    Smoking status: Former Smoker    Smokeless tobacco: Never Used   Substance and Sexual Activity    Alcohol use: Yes     Comment: social    Drug use: No    Sexual activity: Yes     Partners: Male   Lifestyle    Physical activity     Days per week: Not on file     Minutes per session: Not on file    Stress: Not on file   Relationships    Social connections     Talks on phone: Not on file     Gets together: Not on file     Attends Moravian service: Not on file     Active member of club or organization: Not on file     Attends meetings of clubs or organizations: Not on file     Relationship status: Not on file    Intimate partner violence     Fear of current or ex partner: Not on file     Emotionally abused: Not on file     Physically abused: Not on file     Forced sexual activity: Not on file   Other Topics Concern    Not on file   Social History Narrative    Not on file       Objective     Vitals:    04/27/21 0929   BP: 130/72   Pulse: 59   Resp: 16   Temp: (!) 96 8 °F (36 °C)   SpO2: 97%     Wt Readings from Last 3 Encounters:   04/27/21 64 1 kg (141 lb 6 4 oz)   04/20/21 63 8 kg (140 lb 9 6 oz)   12/29/20 63 5 kg (140 lb)       Physical Exam  Vitals signs and nursing note reviewed  Constitutional:       General: She is not in acute distress  Appearance: Normal appearance  She is not ill-appearing, toxic-appearing or diaphoretic  HENT:      Head: Normocephalic and atraumatic  Mouth/Throat:     Eyes:      General: No scleral icterus  Conjunctiva/sclera: Conjunctivae normal    Neurological:      Mental Status: She is alert and oriented to person, place, and time  Psychiatric:         Mood and Affect: Mood normal          Behavior: Behavior normal          Thought Content:  Thought content normal          Judgment: Judgment normal          Pertinent Laboratory/Diagnostic Studies:  Lab Results   Component Value Date    BUN 15 03/10/2020    CREATININE 0 52 (L) 03/10/2020    CALCIUM 8 7 03/10/2020    K 4 1 03/10/2020    CO2 30 03/10/2020     (H) 03/10/2020     Lab Results   Component Value Date    ALT 24 03/10/2020    AST 16 03/10/2020    ALKPHOS 56 03/10/2020       No results found for: WBC, HGB, HCT, MCV, PLT    No results found for: TSH    Lab Results   Component Value Date    CHOL 162 11/19/2015     Lab Results   Component Value Date    TRIG 60 03/10/2020     Lab Results   Component Value Date    HDL 72 03/10/2020     Lab Results Component Value Date    LDLCALC 69 03/10/2020     No results found for: HGBA1C    Results for orders placed or performed in visit on 04/23/21   Novel Coronavirus (Covid-19),PCR [facility] - Collected at   [facility]    Specimen: Nose; Nares   Result Value Ref Range    SARS-CoV-2 Negative Negative       No orders of the defined types were placed in this encounter  ALLERGIES:  No Known Allergies    Current Medications     Current Outpatient Medications   Medication Sig Dispense Refill    Calcium Carb-Cholecalciferol (CALTRATE 600+D) 600-800 MG-UNIT TABS Take 1 tablet by mouth daily      cholecalciferol (VITAMIN D3) 1,000 units tablet Take 1,000 Units by mouth daily      estradiol (VIVELLE-DOT) 0 1 MG/24HR APPLY 1 PATCH ON THE SKIN TWICE A WEEK 24 patch 3    Multiple Vitamin (MULTI-VITAMIN DAILY PO) Take 1 tablet by mouth daily      Omega-3 Fatty Acids (FISH OIL) 1,000 mg Take 1 capsule by mouth daily      Probiotic Product (ACIDOPHILUS XTRA PO) Take 1 tablet by mouth daily      mupirocin (BACTROBAN) 2 % ointment Apply topically 3 (three) times a day for 7 days 30 g 0    valACYclovir (VALTREX) 1,000 mg tablet Take 2 tablets (2,000 mg total) by mouth 2 (two) times a day for 1 day 4 tablet 0     No current facility-administered medications for this visit            Health Maintenance     Health Maintenance   Topic Date Due    COVID-19 Vaccine (1) Never done    Hepatitis C Screening  04/27/2022 (Originally 1960)    HIV Screening  04/28/2023 (Originally 12/11/1975)    MAMMOGRAM  12/29/2021    Depression Screening PHQ  04/20/2022    BMI: Adult  04/20/2022    Annual Physical  04/20/2022    Colonoscopy Surveillance  02/03/2025    DTaP,Tdap,and Td Vaccines (3 - Td) 08/09/2028    Colorectal Cancer Screening  02/03/2030    Influenza Vaccine  Completed    Pneumococcal Vaccine: Pediatrics (0 to 5 Years) and At-Risk Patients (6 to 59 Years)  Aged Out    HIB Vaccine  Aged Out    Hepatitis B Vaccine  Aged Out    IPV Vaccine  Aged Out    Hepatitis A Vaccine  Aged Out    Meningococcal ACWY Vaccine  Aged Out    HPV Vaccine  Aged Out     Immunization History   Administered Date(s) Administered    H1N1, All Formulations 02/01/2010    Influenza, injectable, quadrivalent, preservative free 0 5 mL 10/29/2019, 09/29/2020    Influenza, seasonal, injectable 10/01/2016    Tdap 11/12/2013, 08/09/2018          Jonah Kern DO

## 2021-04-27 NOTE — TELEPHONE ENCOUNTER
She can see another provider today if she would like as I have a meeting and am not here for the rest of the week   It is up to here if she would like to still get her vaccine- she can but she may just feel a little more achy

## 2021-04-27 NOTE — TELEPHONE ENCOUNTER
Patient has had a cold for almost 1 week, it is improving, patient feels that blowing her nose has caused her to develop "boils" under her nose since Saturday evening  The boils are sore to the touch and her nose is red and irritated  Patient was tested for Covid on Friday and that was negative  She would like to know if she can be evaluated for these lesions ? She would also like to know if it would still be okay to get her Covid vaccine tonight? Please advise

## 2021-05-25 ENCOUNTER — IMMUNIZATIONS (OUTPATIENT)
Dept: FAMILY MEDICINE CLINIC | Facility: HOSPITAL | Age: 61
End: 2021-05-25

## 2021-05-25 DIAGNOSIS — Z23 ENCOUNTER FOR IMMUNIZATION: Primary | ICD-10-CM

## 2021-05-25 PROCEDURE — 0002A SARS-COV-2 / COVID-19 MRNA VACCINE (PFIZER-BIONTECH) 30 MCG: CPT

## 2021-05-25 PROCEDURE — 91300 SARS-COV-2 / COVID-19 MRNA VACCINE (PFIZER-BIONTECH) 30 MCG: CPT

## 2021-07-05 ENCOUNTER — HOSPITAL ENCOUNTER (EMERGENCY)
Facility: HOSPITAL | Age: 61
Discharge: HOME | End: 2021-07-05
Attending: EMERGENCY MEDICINE
Payer: COMMERCIAL

## 2021-07-05 ENCOUNTER — APPOINTMENT (EMERGENCY)
Dept: RADIOLOGY | Facility: HOSPITAL | Age: 61
End: 2021-07-05
Payer: COMMERCIAL

## 2021-07-05 ENCOUNTER — APPOINTMENT (EMERGENCY)
Dept: CT IMAGING | Facility: HOSPITAL | Age: 61
End: 2021-07-05
Payer: COMMERCIAL

## 2021-07-05 VITALS
OXYGEN SATURATION: 98 % | BODY MASS INDEX: 23.52 KG/M2 | WEIGHT: 140 LBS | SYSTOLIC BLOOD PRESSURE: 131 MMHG | RESPIRATION RATE: 16 BRPM | HEART RATE: 54 BPM | TEMPERATURE: 97.6 F | DIASTOLIC BLOOD PRESSURE: 60 MMHG

## 2021-07-05 DIAGNOSIS — R55 SYNCOPE: Primary | ICD-10-CM

## 2021-07-05 LAB
ANION GAP SERPL CALCULATED.3IONS-SCNC: 6 MMOL/L (ref 4–13)
APTT PPP: 21 SECONDS (ref 23–37)
ATRIAL RATE: 55 BPM
BASOPHILS # BLD AUTO: 0.12 THOUSANDS/ΜL (ref 0–0.1)
BASOPHILS NFR BLD AUTO: 1 % (ref 0–1)
BUN SERPL-MCNC: 11 MG/DL (ref 5–25)
CALCIUM SERPL-MCNC: 9 MG/DL (ref 8.3–10.1)
CHLORIDE SERPL-SCNC: 106 MMOL/L (ref 100–108)
CO2 SERPL-SCNC: 31 MMOL/L (ref 21–32)
CREAT SERPL-MCNC: 0.66 MG/DL (ref 0.6–1.3)
EOSINOPHIL # BLD AUTO: 0.05 THOUSAND/ΜL (ref 0–0.61)
EOSINOPHIL NFR BLD AUTO: 0 % (ref 0–6)
ERYTHROCYTE [DISTWIDTH] IN BLOOD BY AUTOMATED COUNT: 13.1 % (ref 11.6–15.1)
GFR SERPL CREATININE-BSD FRML MDRD: 97 ML/MIN/1.73SQ M
GLUCOSE SERPL-MCNC: 107 MG/DL (ref 65–140)
HCT VFR BLD AUTO: 41.1 % (ref 34.8–46.1)
HGB BLD-MCNC: 13.6 G/DL (ref 11.5–15.4)
IMM GRANULOCYTES # BLD AUTO: 0.06 THOUSAND/UL (ref 0–0.2)
IMM GRANULOCYTES NFR BLD AUTO: 1 % (ref 0–2)
INR PPP: 0.95 (ref 0.84–1.19)
LYMPHOCYTES # BLD AUTO: 1.28 THOUSANDS/ΜL (ref 0.6–4.47)
LYMPHOCYTES NFR BLD AUTO: 11 % (ref 14–44)
MAGNESIUM SERPL-MCNC: 1.7 MG/DL (ref 1.6–2.6)
MCH RBC QN AUTO: 30.1 PG (ref 26.8–34.3)
MCHC RBC AUTO-ENTMCNC: 33.1 G/DL (ref 31.4–37.4)
MCV RBC AUTO: 91 FL (ref 82–98)
MONOCYTES # BLD AUTO: 0.82 THOUSAND/ΜL (ref 0.17–1.22)
MONOCYTES NFR BLD AUTO: 7 % (ref 4–12)
NEUTROPHILS # BLD AUTO: 9.31 THOUSANDS/ΜL (ref 1.85–7.62)
NEUTS SEG NFR BLD AUTO: 80 % (ref 43–75)
NRBC BLD AUTO-RTO: 0 /100 WBCS
P AXIS: 65 DEGREES
PLATELET # BLD AUTO: 279 THOUSANDS/UL (ref 149–390)
PMV BLD AUTO: 10.1 FL (ref 8.9–12.7)
POTASSIUM SERPL-SCNC: 3.7 MMOL/L (ref 3.5–5.3)
PR INTERVAL: 166 MS
PROTHROMBIN TIME: 12.8 SECONDS (ref 11.6–14.5)
QRS AXIS: 70 DEGREES
QRSD INTERVAL: 80 MS
QT INTERVAL: 436 MS
QTC INTERVAL: 417 MS
RBC # BLD AUTO: 4.52 MILLION/UL (ref 3.81–5.12)
SODIUM SERPL-SCNC: 143 MMOL/L (ref 136–145)
T WAVE AXIS: 57 DEGREES
TROPONIN I SERPL-MCNC: <0.02 NG/ML
TSH SERPL DL<=0.05 MIU/L-ACNC: 1.68 UIU/ML (ref 0.36–3.74)
VENTRICULAR RATE: 55 BPM
WBC # BLD AUTO: 11.64 THOUSAND/UL (ref 4.31–10.16)

## 2021-07-05 PROCEDURE — 85730 THROMBOPLASTIN TIME PARTIAL: CPT | Performed by: PHYSICIAN ASSISTANT

## 2021-07-05 PROCEDURE — 96360 HYDRATION IV INFUSION INIT: CPT

## 2021-07-05 PROCEDURE — 36415 COLL VENOUS BLD VENIPUNCTURE: CPT | Performed by: PHYSICIAN ASSISTANT

## 2021-07-05 PROCEDURE — 80048 BASIC METABOLIC PNL TOTAL CA: CPT | Performed by: PHYSICIAN ASSISTANT

## 2021-07-05 PROCEDURE — 71046 X-RAY EXAM CHEST 2 VIEWS: CPT

## 2021-07-05 PROCEDURE — 85025 COMPLETE CBC W/AUTO DIFF WBC: CPT | Performed by: PHYSICIAN ASSISTANT

## 2021-07-05 PROCEDURE — 99285 EMERGENCY DEPT VISIT HI MDM: CPT

## 2021-07-05 PROCEDURE — 70450 CT HEAD/BRAIN W/O DYE: CPT

## 2021-07-05 PROCEDURE — 83735 ASSAY OF MAGNESIUM: CPT | Performed by: PHYSICIAN ASSISTANT

## 2021-07-05 PROCEDURE — 85610 PROTHROMBIN TIME: CPT | Performed by: PHYSICIAN ASSISTANT

## 2021-07-05 PROCEDURE — 99285 EMERGENCY DEPT VISIT HI MDM: CPT | Performed by: PHYSICIAN ASSISTANT

## 2021-07-05 PROCEDURE — 93005 ELECTROCARDIOGRAM TRACING: CPT

## 2021-07-05 PROCEDURE — 84484 ASSAY OF TROPONIN QUANT: CPT | Performed by: PHYSICIAN ASSISTANT

## 2021-07-05 PROCEDURE — 72125 CT NECK SPINE W/O DYE: CPT

## 2021-07-05 PROCEDURE — 84443 ASSAY THYROID STIM HORMONE: CPT | Performed by: PHYSICIAN ASSISTANT

## 2021-07-05 PROCEDURE — 93010 ELECTROCARDIOGRAM REPORT: CPT | Performed by: INTERNAL MEDICINE

## 2021-07-05 RX ADMIN — SODIUM CHLORIDE 1000 ML: 0.9 INJECTION, SOLUTION INTRAVENOUS at 12:00

## 2021-07-05 NOTE — ED PROVIDER NOTES
History  Chief Complaint   Patient presents with    Syncope     pt c/o head pain after syncopal episode in kitchen  pt denies recent illness or hx of similar s/s  denies head strike or thinners  Patient presents emergency room after having a cramp that awoke her from sleep around 630 this morning  She got up and walked around the relieved the cramp  She states she walked to the refrigerator in the kitchen and suddenly became lightheaded and the next thing you know that she she was on the floor  She awoke on the floor with her  in her view  He states she did not lose consciousness for a long period time it was only approximately like 30 seconds  He hurt her Moan before she fell  She was not incontinent of urine  She did bite her tongue  She had no tonic-clonic movement  She complains of a generalized headache  She did strike her head per her   She hit the cabinet behind her  States he lowered her to the floor  She is complaining of midline cervical spine tenderness  She denies any radicular symptoms of numbness, tingling, weakness  She denies any bowel or bladder incontinence  She denies any chest pain, palpitations, shortness of breath  They live in a [place] house of the kitchen is not far from the bedroom  Past medical history is positive for conjunctiva hemorrhage, left ear pain  History provided by:  Patient  Syncope  Episode history:  Single  Most recent episode:   Today  Duration:  30 seconds  Timing:  Intermittent  Progression:  Resolved  Chronicity:  New  Context: normal activity and standing up    Context: not blood draw, not bowel movement, not dehydration, not exertion, not inactivity, not medication change, not sight of blood, not sitting down and not urination    Witnessed: yes    Relieved by:  Bed rest  Worsened by:  Nothing  Associated symptoms: headaches and malaise/fatigue    Associated symptoms: no anxiety, no chest pain, no confusion, no diaphoresis, no difficulty breathing, no dizziness, no fever, no focal sensory loss, no focal weakness, no nausea, no palpitations, no recent fall, no recent injury, no recent surgery, no rectal bleeding, no seizures, no shortness of breath, no visual change, no vomiting and no weakness    Risk factors: no congenital heart disease, no coronary artery disease, no seizures and no vascular disease        Prior to Admission Medications   Prescriptions Last Dose Informant Patient Reported? Taking?    Calcium Carb-Cholecalciferol (CALTRATE 600+D) 600-800 MG-UNIT TABS  Self Yes No   Sig: Take 1 tablet by mouth daily   Multiple Vitamin (MULTI-VITAMIN DAILY PO)  Self Yes No   Sig: Take 1 tablet by mouth daily   Omega-3 Fatty Acids (FISH OIL) 1,000 mg  Self Yes No   Sig: Take 1 capsule by mouth daily   Probiotic Product (ACIDOPHILUS XTRA PO)  Self Yes No   Sig: Take 1 tablet by mouth daily   cholecalciferol (VITAMIN D3) 1,000 units tablet  Self Yes No   Sig: Take 1,000 Units by mouth daily   estradiol (VIVELLE-DOT) 0 1 MG/24HR  Self No No   Sig: APPLY 1 PATCH ON THE SKIN TWICE A WEEK   mupirocin (BACTROBAN) 2 % ointment   No No   Sig: Apply topically 3 (three) times a day for 7 days   valACYclovir (VALTREX) 1,000 mg tablet   No No   Sig: Take 2 tablets (2,000 mg total) by mouth 2 (two) times a day for 1 day      Facility-Administered Medications: None       Past Medical History:   Diagnosis Date    Conjunctival hemorrhage, right 2019    Ecchymoses, spontaneous     Last assessed 16    Edema     Last assessed 13    H/O total hysterectomy     Impetigo     last assessed 16    Left ear pain 2018       Past Surgical History:   Procedure Laterality Date     SECTION      OOPHORECTOMY Bilateral     age 44    TOTAL ABDOMINAL HYSTERECTOMY      age 44       Family History   Problem Relation Age of Onset    Heart attack Mother     Aneurysm Mother     COPD Mother     Coronary artery disease Mother    Alberto John Diabetes Mother     COPD Father     Heart attack Father     Emphysema Father     Breast cancer Sister 62    Diabetes Paternal Grandmother     Stroke Paternal Grandfather     Breast cancer Paternal [de-identified]         unknown age   Bertrand Hunter Crohn's disease Family     Breast cancer Family     Lung cancer Family     Stroke Maternal Grandfather     COPD Sister     Emphysema Sister     Hypertension Sister     Hypertension Sister     Lung cancer Sister     No Known Problems Brother     No Known Problems Son     No Known Problems Son     No Known Problems Paternal Aunt     No Known Problems Paternal Aunt     No Known Problems Paternal Aunt     Lung cancer Paternal Uncle         unknown age     I have reviewed and agree with the history as documented  E-Cigarette/Vaping     E-Cigarette/Vaping Substances     Social History     Tobacco Use    Smoking status: Former Smoker    Smokeless tobacco: Never Used   Substance Use Topics    Alcohol use: Yes     Comment: social    Drug use: No       Review of Systems   Constitutional: Positive for activity change, fatigue and malaise/fatigue  Negative for appetite change, chills, diaphoresis and fever  HENT: Negative for congestion, dental problem, ear discharge, ear pain, mouth sores, postnasal drip, rhinorrhea, sore throat and trouble swallowing  Eyes: Negative for discharge, redness and itching  Respiratory: Negative for cough, chest tightness and shortness of breath  Cardiovascular: Positive for syncope  Negative for chest pain and palpitations  Gastrointestinal: Negative for nausea and vomiting  Genitourinary: Negative for dysuria, frequency, hematuria and urgency  Musculoskeletal: Positive for neck pain  Negative for back pain and gait problem  Skin: Negative for color change, pallor and rash  Neurological: Positive for syncope and headaches  Negative for dizziness, focal weakness, seizures, speech difficulty, weakness and numbness  Psychiatric/Behavioral: Negative for confusion  All other systems reviewed and are negative  Physical Exam  Physical Exam  Vitals and nursing note reviewed  Constitutional:       General: She is not in acute distress  Appearance: Normal appearance  She is normal weight  She is not ill-appearing, toxic-appearing or diaphoretic  HENT:      Head: Normocephalic  Comments: Contusion tenderness over the occipital area  Right Ear: Tympanic membrane, ear canal and external ear normal       Left Ear: Tympanic membrane, ear canal and external ear normal       Nose: No congestion or rhinorrhea  Mouth/Throat:      Pharynx: No oropharyngeal exudate or posterior oropharyngeal erythema  Eyes:      General:         Right eye: No discharge  Left eye: No discharge  Conjunctiva/sclera: Conjunctivae normal    Neck:      Vascular: No carotid bruit  Cardiovascular:      Rate and Rhythm: Normal rate and regular rhythm  Heart sounds: Normal heart sounds  No murmur heard  No friction rub  No gallop  Pulmonary:      Effort: Pulmonary effort is normal       Breath sounds: Normal breath sounds  No rhonchi or rales  Abdominal:      General: Abdomen is flat  There is no distension  Tenderness: There is no abdominal tenderness  There is no guarding or rebound  Musculoskeletal:      Cervical back: Neck supple  Tenderness present  No rigidity  Comments: Cervical spine-it is atraumatic upon inspection  Patient does have midline cervical tenderness over the bony spinous processes  There is no paravertebral spasm noted  Reflexes are +2 and symmetrical   Motor and sensory are intact to the axillary, median, ulnar, radial distribution of the upper extremities  Lymphadenopathy:      Cervical: No cervical adenopathy  Skin:     General: Skin is warm  Capillary Refill: Capillary refill takes less than 2 seconds  Findings: No rash        Comments: Contusion in tenderness min occipital area  Neurological:      General: No focal deficit present  Mental Status: She is alert and oriented to person, place, and time  Psychiatric:         Mood and Affect: Mood normal          Behavior: Behavior normal          Thought Content:  Thought content normal          Judgment: Judgment normal          Vital Signs  ED Triage Vitals   Temperature Pulse Respirations Blood Pressure SpO2   07/05/21 1042 07/05/21 1042 07/05/21 1042 07/05/21 1042 07/05/21 1042   97 6 °F (36 4 °C) 79 18 150/69 99 %      Temp src Heart Rate Source Patient Position - Orthostatic VS BP Location FiO2 (%)   -- 07/05/21 1042 07/05/21 1042 07/05/21 1042 --    Monitor Sitting Right arm       Pain Score       07/05/21 1117       7           Vitals:    07/05/21 1042 07/05/21 1117 07/05/21 1200 07/05/21 1330   BP: 150/69 159/74 162/78 131/60   Pulse: 79 66 (!) 54 (!) 54   Patient Position - Orthostatic VS: Sitting Sitting           Visual Acuity  Visual Acuity      Most Recent Value   L Pupil Size (mm)  3   R Pupil Size (mm)  3          ED Medications  Medications   sodium chloride 0 9 % bolus 1,000 mL (0 mL Intravenous Stopped 7/5/21 1300)       Diagnostic Studies  Results Reviewed     Procedure Component Value Units Date/Time    Basic metabolic panel [[id number]] Collected: 07/05/21 1159    Lab Status: Final result Specimen: Blood from Arm, Right Updated: 07/05/21 1230     Sodium 143 mmol/L      Potassium 3 7 mmol/L      Chloride 106 mmol/L      CO2 31 mmol/L      ANION GAP 6 mmol/L      BUN 11 mg/dL      Creatinine 0 66 mg/dL      Glucose 107 mg/dL      Calcium 9 0 mg/dL      eGFR 97 ml/min/1 73sq m     Narrative:      [place] guidelines for Chronic Kidney Disease (CKD):     Stage 1 with normal or high GFR (GFR > 90 mL/min/1 73 square meters)    Stage 2 Mild CKD (GFR = 60-89 mL/min/1 73 square meters)    Stage 3A Moderate CKD (GFR = 45-59 mL/min/1 73 square meters)    Stage 3B Moderate CKD (GFR = 30-44 mL/min/1 73 square meters)    Stage 4 Severe CKD (GFR = 15-29 mL/min/1 73 square meters)    Stage 5 End Stage CKD (GFR <15 mL/min/1 73 square meters)  Note: GFR calculation is accurate only with a steady state creatinine    TSH [[id number]]  (Normal) Collected: 07/05/21 1159    Lab Status: Final result Specimen: Blood from Arm, Right Updated: 07/05/21 1230     TSH 3RD GENERATON 1 683 uIU/mL     Narrative:      Patients undergoing fluorescein dye angiography may retain small amounts of fluorescein in the body for 48-72 hours post procedure  Samples containing fluorescein can produce falsely depressed TSH values  If the patient had this procedure,a specimen should be resubmitted post fluorescein clearance        Magnesium [[id number]]  (Normal) Collected: 07/05/21 1159    Lab Status: Final result Specimen: Blood from Arm, Right Updated: 07/05/21 1230     Magnesium 1 7 mg/dL     Troponin I [[id number]]  (Normal) Collected: 07/05/21 1159    Lab Status: Final result Specimen: Blood from Arm, Right Updated: 07/05/21 1226     Troponin I <0 02 ng/mL     Protime-INR [[id number]]  (Normal) Collected: 07/05/21 1159    Lab Status: Final result Specimen: Blood from Arm, Right Updated: 07/05/21 1220     Protime 12 8 seconds      INR 0 95    APTT [[id number]]  (Abnormal) Collected: 07/05/21 1159    Lab Status: Final result Specimen: Blood from Arm, Right Updated: 07/05/21 1220     PTT 21 seconds     CBC and differential [[id number]]  (Abnormal) Collected: 07/05/21 1159    Lab Status: Final result Specimen: Blood from Arm, Right Updated: 07/05/21 1208     WBC 11 64 Thousand/uL      RBC 4 52 Million/uL      Hemoglobin 13 6 g/dL      Hematocrit 41 1 %      MCV 91 fL      MCH 30 1 pg      MCHC 33 1 g/dL      RDW 13 1 %      MPV 10 1 fL      Platelets 988 Thousands/uL      nRBC 0 /100 WBCs      Neutrophils Relative 80 %      Immat GRANS % 1 %      Lymphocytes Relative 11 %      Monocytes Relative 7 %      Eosinophils Relative 0 %      Basophils Relative 1 %      Neutrophils Absolute 9 31 Thousands/µL      Immature Grans Absolute 0 06 Thousand/uL      Lymphocytes Absolute 1 28 Thousands/µL      Monocytes Absolute 0 82 Thousand/µL      Eosinophils Absolute 0 05 Thousand/µL      Basophils Absolute 0 12 Thousands/µL                  CT spine cervical without contrast   ED Interpretation by Sonia Barnes PA-C (07/05 1330)   No acute cervical spine fracture or traumatic malalignment      Final Result by George Sharma MD (07/05 1320)      No acute cervical spine fracture or traumatic malalignment  Workstation performed: [id number]         CT head without contrast   ED Interpretation by Sonia Barnes PA-C (07/05 1330)   No acute intracranial abnormality      Final Result by George Sharma MD (07/05 1318)      No acute intracranial abnormality  Workstation performed: [id number]         XR chest 2 views   ED Interpretation by Sonia Barnes PA-C (07/05 1256)   No acute cardiopulmonary disease      Final Result by Alvaro Lima MD (07/05 1609)      No acute cardiopulmonary disease                    Workstation performed: [id number]                    Procedures  ECG 12 Lead Documentation Only    Date/Time: 7/5/2021 1:02 PM  Performed by: Sonia Barnes PA-C  Authorized by: Sonia Barnes PA-C     Indications / Diagnosis:  Syncope  ECG reviewed by me, the ED Provider: yes    Patient location:  ED  Previous ECG:     Previous ECG:  Unavailable    Comparison to cardiac monitor: Yes    Interpretation:     Interpretation: non-specific    Rate:     ECG rate:  55    ECG rate assessment: bradycardic    Rhythm:     Rhythm: sinus bradycardia    Ectopy:     Ectopy: none    QRS:     QRS axis:  Normal    QRS intervals:  Normal  Conduction:     Conduction: normal    ST segments:     ST segments:  Normal  T waves:     T waves: normal    Comments:      No signs of acute ischemia    Independently interpreted by me             ED Course  ED Course as of Jul 05 2001 Mon Jul 05, 2021   [address] [facility] syncope Rule was 0  Patient has low risk of any acute events  Plan discharge home  She will have a follow-up appointment with her physician in the next 1-2 days  MDM  Number of Diagnoses or Management Options  Syncope: new and requires workup     Amount and/or Complexity of Data Reviewed  Clinical lab tests: ordered and reviewed  Tests in the radiology section of CPT®: ordered and reviewed  Tests in the medicine section of CPT®: ordered and reviewed    Risk of Complications, Morbidity, and/or Mortality  Presenting problems: high  Diagnostic procedures: high  Management options: high  General comments: Patient presents to the emergency room after having the sudden cramp in her leg that awoke her from sleep  She got up to walk to the kitchen to get a drink when she suddenly became lightheaded and passed out hitting the back of her head on the cabinet and injuring her neck  She complained of a generalized headache upon evaluation  She was seen and evaluated  Laboratory studies were taken and were reviewed  EKG did not show any signs of acute ischemia  A chest x-ray demonstrated no acute cardiopulmonary disease  She had a [facility] syncope Score of 0  She was discharged home and was instructed to have a recheck exam with her physician  She was given reasons to return to the emergency room should her symptoms worsen      Patient Progress  Patient progress: stable      Disposition  Final diagnoses:   Syncope     Time reflects when diagnosis was documented in both MDM as applicable and the Disposition within this note     Time User Action Codes Description Comment    7/5/2021  1:33 PM Mayco Thompson Add [R55] Syncope       ED Disposition     ED Disposition Condition Date/Time Comment    Discharge Stable Mon Jul 5, 2021  1:33 PM [id number] [facility] discharge to home/self care  Follow-up Information     Follow up With Specialties Details Why Contact Info    Marco Bach DO Family Medicine Schedule an appointment as soon as possible for a visit in 1 day for a recheck and for a blood pressure check [address] [facility] [address]  [phone]            Discharge Medication List as of 7/5/2021  1:34 PM      CONTINUE these medications which have NOT CHANGED    Details   Calcium Carb-Cholecalciferol (CALTRATE 600+D) 600-800 MG-UNIT TABS Take 1 tablet by mouth daily, Starting Thu 11/12/2015, Historical Med      cholecalciferol (VITAMIN D3) 1,000 units tablet Take 1,000 Units by mouth daily, Historical Med      estradiol (VIVELLE-DOT) 0 1 MG/24HR APPLY 1 PATCH ON THE SKIN TWICE A WEEK, Normal      Multiple Vitamin (MULTI-VITAMIN DAILY PO) Take 1 tablet by mouth daily, Starting Thu 11/12/2015, Historical Med      mupirocin (BACTROBAN) 2 % ointment Apply topically 3 (three) times a day for 7 days, Starting Tue 4/27/2021, Until Tue 5/4/2021, Normal      Omega-3 Fatty Acids (FISH OIL) 1,000 mg Take 1 capsule by mouth daily, Starting Thu 11/12/2015, Historical Med      Probiotic Product (ACIDOPHILUS XTRA PO) Take 1 tablet by mouth daily, Starting Thu 11/12/2015, Historical Med      valACYclovir (VALTREX) 1,000 mg tablet Take 2 tablets (2,000 mg total) by mouth 2 (two) times a day for 1 day, Starting Tue 4/27/2021, Until Wed 4/28/2021, Normal           No discharge procedures on file      PDMP Review     None          ED Provider  Electronically Signed by           Hilda Addison PA-C  07/05/21 2002

## 2021-07-05 NOTE — Clinical Note
Alli Pérez was seen and treated in our emergency department on 7/5/2021  Diagnosis:      Jonathan Moe     She may return on this date: 07/06/2021          If you have any questions or concerns, please don't hesitate to call        Adonay Stephenson MD    ______________________________           _______________          _______________  Hospital Representative                              Date                                Time

## 2021-07-05 NOTE — ED NOTES
Patient transported to X-ray and returned to room from X-Ray  Patient reconnected to vitals and cardiac monitor        Betsy Barrientos  07/05/21 7478

## 2021-07-12 ENCOUNTER — OFFICE VISIT (OUTPATIENT)
Dept: FAMILY MEDICINE CLINIC | Facility: CLINIC | Age: 61
End: 2021-07-12
Payer: COMMERCIAL

## 2021-07-12 VITALS
BODY MASS INDEX: 23.26 KG/M2 | TEMPERATURE: 97.2 F | SYSTOLIC BLOOD PRESSURE: 130 MMHG | DIASTOLIC BLOOD PRESSURE: 80 MMHG | OXYGEN SATURATION: 100 % | HEART RATE: 49 BPM | WEIGHT: 139.6 LBS | RESPIRATION RATE: 16 BRPM | HEIGHT: 65 IN

## 2021-07-12 DIAGNOSIS — J44.9 COPD, MILD (HCC): ICD-10-CM

## 2021-07-12 DIAGNOSIS — R00.1 BRADYCARDIA: ICD-10-CM

## 2021-07-12 DIAGNOSIS — R55 SYNCOPE, UNSPECIFIED SYNCOPE TYPE: Primary | ICD-10-CM

## 2021-07-12 PROCEDURE — 1036F TOBACCO NON-USER: CPT | Performed by: FAMILY MEDICINE

## 2021-07-12 PROCEDURE — 99214 OFFICE O/P EST MOD 30 MIN: CPT | Performed by: FAMILY MEDICINE

## 2021-07-12 PROCEDURE — 3008F BODY MASS INDEX DOCD: CPT | Performed by: FAMILY MEDICINE

## 2021-07-12 NOTE — PROGRESS NOTES
Assessment/Plan:    1  Syncope, unspecified syncope type  Assessment & Plan:  Possible dehydration  Or related to bradycardia  Refer to cardiology    Orders:  -     Ambulatory referral to Cardiology; Future    2  Bradycardia  Assessment & Plan:  Refer to cardiology for zio patch and eval    Orders:  -     Ambulatory referral to Cardiology; Future    3  COPD, mild ([address] )  Assessment & Plan:  Quit smoking age 46  Had seen changed on prior imaging            There are no Patient Instructions on file for this visit  No follow-ups on file  Subjective:      Patient ID: Johanna Fraser is a 62 y o  female  Chief Complaint   Patient presents with    Follow-up     PT is being seen today for a ER F/U       Follow up from ER  Got up from bed due to left leg cramp  Got up to walk it off and vaughn to the refridge  Got ice tea and then  found her on the floor  Hit head either on floor  Er did labs and imaging- reviewed        The following portions of the patient's history were reviewed and updated as appropriate: allergies, current medications, past family history, past medical history, past social history, past surgical history and problem list     Review of Systems   Constitutional: Negative  HENT: Negative  Eyes: Negative  Respiratory: Negative  Cardiovascular: Negative  Gastrointestinal: Negative  Endocrine: Negative  Genitourinary: Negative  Musculoskeletal: Negative  Neurological: Positive for dizziness and headaches  Hematological: Negative  Psychiatric/Behavioral: Negative            Current Outpatient Medications   Medication Sig Dispense Refill    Calcium Carb-Cholecalciferol (CALTRATE 600+D) 600-800 MG-UNIT TABS Take 1 tablet by mouth daily      cholecalciferol (VITAMIN D3) 1,000 units tablet Take 1,000 Units by mouth daily      estradiol (VIVELLE-DOT) 0 1 MG/24HR APPLY 1 PATCH ON THE SKIN TWICE A WEEK 24 patch 3    Multiple Vitamin (MULTI-VITAMIN DAILY PO) Take 1 tablet by mouth daily      mupirocin (BACTROBAN) 2 % ointment Apply topically 3 (three) times a day for 7 days 30 g 0    Omega-3 Fatty Acids (FISH OIL) 1,000 mg Take 1 capsule by mouth daily      Probiotic Product (ACIDOPHILUS XTRA PO) Take 1 tablet by mouth daily      valACYclovir (VALTREX) 1,000 mg tablet Take 2 tablets (2,000 mg total) by mouth 2 (two) times a day for 1 day 4 tablet 0     No current facility-administered medications for this visit  Objective:    /80   Pulse (!) 49   Temp (!) 97 2 °F (36 2 °C) (Tympanic)   Resp 16   Ht 5' 4 69" (1 643 m)   Wt 63 3 kg (139 lb 9 6 oz)   SpO2 100%   BMI 23 45 kg/m²        Physical Exam  Vitals and nursing note reviewed  Constitutional:       Appearance: Normal appearance  HENT:      Head: Normocephalic and atraumatic  Eyes:      Extraocular Movements: Extraocular movements intact  Pupils: Pupils are equal, round, and reactive to light  Cardiovascular:      Rate and Rhythm: Normal rate and regular rhythm  Pulses: Normal pulses  Heart sounds: Normal heart sounds  Pulmonary:      Effort: Pulmonary effort is normal       Breath sounds: Normal breath sounds  Abdominal:      General: Abdomen is flat  Bowel sounds are normal       Palpations: Abdomen is soft  Musculoskeletal:         General: Normal range of motion  Cervical back: Normal range of motion and neck supple  Skin:     General: Skin is warm  Capillary Refill: Capillary refill takes less than 2 seconds  Neurological:      General: No focal deficit present  Mental Status: She is alert and oriented to person, place, and time  Psychiatric:         Mood and Affect: Mood normal          Behavior: Behavior normal          Thought Content:  Thought content normal          Judgment: Judgment normal                 Si Mikala DO

## 2021-07-22 DIAGNOSIS — Z79.890 HORMONE REPLACEMENT THERAPY (HRT): ICD-10-CM

## 2021-07-22 RX ORDER — ESTRADIOL 0.1 MG/D
1 FILM, EXTENDED RELEASE TRANSDERMAL 2 TIMES WEEKLY
Qty: 24 PATCH | Refills: 3 | Status: SHIPPED | OUTPATIENT
Start: 2021-07-22 | End: 2021-07-23 | Stop reason: SDUPTHER

## 2021-07-22 NOTE — TELEPHONE ENCOUNTER
Chula Luke from [facility] called to get a refill for patient     Medication:estradiol (VIVELLE-DOT) 0 1 MG/24HR     Dosage:  How Often:Sig: APPLY 1 PATCH ON THE SKIN TWICE A WEEK  Quantity:  24 patches  Last Office Visit: 7/12/2021  Next Office Visit: Petr Greer  Last refilled: written 11/24/2020  How many pills left: 0  Pharmacy:   [facility] [address] [place] [address]  Phone: [phone] Fax: [phone]        Call back number: [phone] opt 2  Ref #:[id number]

## 2021-07-23 DIAGNOSIS — Z79.890 HORMONE REPLACEMENT THERAPY (HRT): ICD-10-CM

## 2021-07-23 RX ORDER — ESTRADIOL 0.1 MG/D
1 FILM, EXTENDED RELEASE TRANSDERMAL 2 TIMES WEEKLY
Qty: 24 PATCH | Refills: 0 | Status: SHIPPED | OUTPATIENT
Start: 2021-07-26 | End: 2022-01-11 | Stop reason: SDUPTHER

## 2021-09-22 ENCOUNTER — OFFICE VISIT (OUTPATIENT)
Dept: CARDIOLOGY CLINIC | Facility: CLINIC | Age: 61
End: 2021-09-22
Payer: COMMERCIAL

## 2021-09-22 VITALS
HEART RATE: 58 BPM | SYSTOLIC BLOOD PRESSURE: 132 MMHG | DIASTOLIC BLOOD PRESSURE: 80 MMHG | HEIGHT: 64 IN | BODY MASS INDEX: 23.54 KG/M2 | WEIGHT: 137.9 LBS

## 2021-09-22 DIAGNOSIS — R00.1 BRADYCARDIA: ICD-10-CM

## 2021-09-22 DIAGNOSIS — Z00.00 HEALTHCARE MAINTENANCE: Primary | ICD-10-CM

## 2021-09-22 DIAGNOSIS — R55 SYNCOPE, UNSPECIFIED SYNCOPE TYPE: ICD-10-CM

## 2021-09-22 PROCEDURE — 99204 OFFICE O/P NEW MOD 45 MIN: CPT | Performed by: INTERNAL MEDICINE

## 2021-09-22 PROCEDURE — 93000 ELECTROCARDIOGRAM COMPLETE: CPT | Performed by: INTERNAL MEDICINE

## 2021-09-22 PROCEDURE — 3008F BODY MASS INDEX DOCD: CPT | Performed by: INTERNAL MEDICINE

## 2021-09-22 NOTE — PATIENT INSTRUCTIONS
Recommendations:  1  No cardiac w/u necessary at this time - no symptoms in over 2 months  2  Vagal exercises taught if further episodes of lightheadedness  3  Follow up on an as needed basis

## 2021-09-22 NOTE — PROGRESS NOTES
Cardiology   Jose Johnnie Davenport 62 y o  female MRN: [id number]        Impression:  1  Syncope - likely vasovagal combined with possible volume depletion  Heart rate minimally bradycardic  No symptoms since  2  Bradycardia - no evidence of clinically significant bradycardia  No indication for Zio monitor  Recommendations:  1  No cardiac w/u necessary at this time - no symptoms in over 2 months  2  Vagal exercises taught if further episodes of lightheadedness  3  Follow up on an as needed basis  HPI: Alessandro Hughes is a 62y o  year old female with COPD, bradycardia, and syncopal episode who presents for evaluation  In 2021, woke up one morning, had cramp in leg  Walked to get tea, felt dizzy for a few seconds and passed out  No prior episodes of syncope  Rarely feels dizzy  Otherwise feels well  No chest pain, shortness of breath, or palpitations  Since that time, feels well  Exercises without difficulty  Review of Systems   Constitutional: Negative  HENT: Negative  Eyes: Negative  Respiratory: Negative for chest tightness and shortness of breath  Cardiovascular: Negative for chest pain, palpitations and leg swelling  Gastrointestinal: Negative  Endocrine: Negative  Genitourinary: Negative  Musculoskeletal: Negative  Skin: Negative  Allergic/Immunologic: Negative  Neurological: Positive for syncope  Hematological: Negative  Psychiatric/Behavioral: Negative  All other systems reviewed and are negative          Past Medical History:   Diagnosis Date    Conjunctival hemorrhage, right 2019    Ecchymoses, spontaneous     Last assessed 16    Edema     Last assessed 13    H/O total hysterectomy     Impetigo     last assessed 16    Left ear pain 2018     Past Surgical History:   Procedure Laterality Date     SECTION      OOPHORECTOMY Bilateral     age 37    TOTAL ABDOMINAL HYSTERECTOMY      age 1    valACYclovir (VALTREX) 1,000 mg tablet, Take 2 tablets (2,000 mg total) by mouth 2 (two) times a day for 1 day (Patient not taking: Reported on 9/22/2021), Disp: 4 tablet, Rfl: 0      Wt Readings from Last 3 Encounters:   09/22/21 62 6 kg (137 lb 14 4 oz)   07/12/21 63 3 kg (139 lb 9 6 oz)   07/05/21 63 5 kg (140 lb)     Temp Readings from Last 3 Encounters:   07/12/21 (!) 97 2 °F (36 2 °C) (Tympanic)   07/05/21 97 6 °F (36 4 °C)   04/27/21 (!) 96 8 °F (36 °C)     BP Readings from Last 3 Encounters:   09/22/21 132/80   07/12/21 130/80   07/05/21 131/60     Pulse Readings from Last 3 Encounters:   09/22/21 58   07/12/21 (!) 49   07/05/21 (!) 54         Physical Exam  HENT:      Head: Atraumatic  Mouth/Throat:      Mouth: Mucous membranes are moist    Eyes:      Extraocular Movements: Extraocular movements intact  Cardiovascular:      Rate and Rhythm: Normal rate and regular rhythm  Heart sounds: Normal heart sounds  Pulmonary:      Effort: Pulmonary effort is normal       Breath sounds: Normal breath sounds  Abdominal:      General: Abdomen is flat  Musculoskeletal:         General: Normal range of motion  Cervical back: Normal range of motion  Skin:     General: Skin is warm  Neurological:      General: No focal deficit present  Mental Status: She is alert and oriented to person, place, and time     Psychiatric:         Mood and Affect: Mood normal          Behavior: Behavior normal            Laboratory Studies:  CMP:  Lab Results   Component Value Date    K 3 7 07/05/2021     07/05/2021    CO2 31 07/05/2021    BUN 11 07/05/2021    CREATININE 0 66 07/05/2021    AST 16 03/10/2020    ALT 24 03/10/2020    EGFR 97 07/05/2021       Lipid Profile:   Lab Results   Component Value Date    CHOL 162 11/19/2015     Lab Results   Component Value Date    HDL 72 03/10/2020     Lab Results   Component Value Date    LDLCALC 69 03/10/2020     Lab Results   Component Value Date    TRIG 60 03/10/2020       Cardiac testing:   EKG reviewed personally: ERIN Olivas

## 2021-09-27 ENCOUNTER — TELEPHONE (OUTPATIENT)
Dept: OTHER | Facility: OTHER | Age: 61
End: 2021-09-27

## 2021-12-30 ENCOUNTER — HOSPITAL ENCOUNTER (OUTPATIENT)
Dept: RADIOLOGY | Age: 61
Discharge: HOME | End: 2021-12-30
Payer: COMMERCIAL

## 2021-12-30 VITALS — BODY MASS INDEX: 23.39 KG/M2 | HEIGHT: 64 IN | WEIGHT: 137 LBS

## 2021-12-30 DIAGNOSIS — Z12.31 ENCOUNTER FOR SCREENING MAMMOGRAM FOR MALIGNANT NEOPLASM OF BREAST: ICD-10-CM

## 2021-12-30 PROCEDURE — 77067 SCR MAMMO BI INCL CAD: CPT

## 2021-12-30 PROCEDURE — 77063 BREAST TOMOSYNTHESIS BI: CPT

## 2022-01-11 DIAGNOSIS — Z79.890 HORMONE REPLACEMENT THERAPY (HRT): ICD-10-CM

## 2022-01-11 RX ORDER — ESTRADIOL 0.1 MG/D
1 FILM, EXTENDED RELEASE TRANSDERMAL 2 TIMES WEEKLY
Qty: 24 PATCH | Refills: 0 | Status: SHIPPED | OUTPATIENT
Start: 2022-01-13 | End: 2022-03-21 | Stop reason: SDUPTHER

## 2022-03-21 ENCOUNTER — OFFICE VISIT (OUTPATIENT)
Dept: FAMILY MEDICINE CLINIC | Facility: CLINIC | Age: 62
End: 2022-03-21
Payer: COMMERCIAL

## 2022-03-21 VITALS
SYSTOLIC BLOOD PRESSURE: 102 MMHG | HEART RATE: 61 BPM | DIASTOLIC BLOOD PRESSURE: 64 MMHG | WEIGHT: 130.6 LBS | OXYGEN SATURATION: 98 % | BODY MASS INDEX: 22.42 KG/M2

## 2022-03-21 DIAGNOSIS — R10.31 ABDOMINAL WALL PAIN IN RIGHT LOWER QUADRANT: Primary | ICD-10-CM

## 2022-03-21 DIAGNOSIS — K59.00 CONSTIPATION, UNSPECIFIED CONSTIPATION TYPE: ICD-10-CM

## 2022-03-21 DIAGNOSIS — Z79.890 HORMONE REPLACEMENT THERAPY (HRT): ICD-10-CM

## 2022-03-21 PROCEDURE — 1036F TOBACCO NON-USER: CPT | Performed by: FAMILY MEDICINE

## 2022-03-21 PROCEDURE — 99213 OFFICE O/P EST LOW 20 MIN: CPT | Performed by: FAMILY MEDICINE

## 2022-03-21 RX ORDER — DOCUSATE SODIUM 100 MG/1
100 CAPSULE, LIQUID FILLED ORAL 2 TIMES DAILY
Qty: 60 CAPSULE | Refills: 0 | Status: SHIPPED | OUTPATIENT
Start: 2022-03-21 | End: 2022-03-21

## 2022-03-21 RX ORDER — DOCUSATE SODIUM 100 MG/1
100 CAPSULE, LIQUID FILLED ORAL 2 TIMES DAILY
Qty: 60 CAPSULE | Refills: 0 | Status: SHIPPED | OUTPATIENT
Start: 2022-03-21 | End: 2022-05-16 | Stop reason: SDUPTHER

## 2022-03-21 NOTE — ASSESSMENT & PLAN NOTE
Chronic issue  Uses Senakot intermittently   Recommend colace 100 mg BID   Asked to increase fluids and fiber intake  Stool ball felt on exam   Asked to call if no BM in 2 days   May need mg citrate or miralax

## 2022-03-21 NOTE — PROGRESS NOTES
Assessment/Plan:     Problem List Items Addressed This Visit        Other    Constipation     Chronic issue  Uses Senakot intermittently   Recommend colace 100 mg BID   Asked to increase fluids and fiber intake  Stool ball felt on exam   Asked to call if no BM in 2 days  May need mg citrate or miralax           Relevant Medications    docusate sodium (COLACE) 100 mg capsule    Abdominal wall pain in right lower quadrant - Primary     Pain in the right lower quadrant for 1 week  Exacerbated by bending, twisting, or lifting  Denies urinary symptoms but does report constipation ( bristol type 1)   Start colace 100 mg BID with fiber and lots of fluids   Positive carnette sign indicates abdominal wall pain  May apply heat and take NSAIDs for pain   Call precautions  Asked to call if she doesn't move her bowel by Wednesday  Subjective:      Patient ID: Afsaneh Avendano is a 62 y o  female here with right lower quadrant pain  Started last week  No prior injuries  Had 2 c sections and open hysterectomy in 2010  Pain worse with bending and twisting  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, 11, and 6 years)  Maximiliano has also been constipated  Type 1 on the bristol stool chart  Last BM was on Saturday  No blood in the stools or obstipation  Denies nausea, vomiting, hematuria, or urinary frequency  History of kidney stones as a teenager  Uses Senakot on occasions  Doesn't want to rely on it  The following portions of the patient's history were reviewed and updated as appropriate:   She  has a past medical history of Conjunctival hemorrhage, right (9/17/2019), Ecchymoses, spontaneous, Edema, H/O total hysterectomy, Impetigo, and Left ear pain (8/23/2018)    She   Patient Active Problem List    Diagnosis Date Noted    Constipation 03/21/2022    Abdominal wall pain in right lower quadrant 03/21/2022    Bradycardia 07/12/2021    Annual physical exam 04/20/2021    Screening for breast cancer 2020    Well adult exam 2018    Adenomatous colon polyp 2013     She  has a past surgical history that includes  section; Total abdominal hysterectomy; and Oophorectomy (Bilateral)  Her family history includes Aneurysm in her mother; Breast cancer in her family and paternal aunt; Breast cancer (age of onset: 62) in her sister; COPD in her father, mother, and sister; Cancer in her sister; Coronary artery disease in her mother; Crohn's disease in her family; Diabetes in her mother and paternal grandmother; Emphysema in her father and sister; Heart attack in her father and mother; Hyperlipidemia in her father and mother; Hypertension in her father, mother, and sister; Lung cancer in her family, paternal uncle, and sister; No Known Problems in her brother, paternal aunt, paternal aunt, paternal aunt, son, and son; Stroke in her maternal grandfather and paternal grandfather  She  reports that she has quit smoking  She has never used smokeless tobacco  She reports current alcohol use  She reports that she does not use drugs    Current Outpatient Medications on File Prior to Visit   Medication Sig    Calcium Carb-Cholecalciferol (CALTRATE 600+D) 600-800 MG-UNIT TABS Take 1 tablet by mouth daily    cholecalciferol (VITAMIN D3) 1,000 units tablet Take 1,000 Units by mouth daily    estradiol (VIVELLE-DOT) 0 1 MG/24HR Place 1 patch on the skin 2 (two) times a week    Multiple Vitamin (MULTI-VITAMIN DAILY PO) Take 1 tablet by mouth daily    Omega-3 Fatty Acids (FISH OIL) 1,000 mg Take 1 capsule by mouth daily    Probiotic Product (ACIDOPHILUS XTRA PO) Take 1 tablet by mouth daily    [DISCONTINUED] mupirocin (BACTROBAN) 2 % ointment Apply topically 3 (three) times a day for 7 days (Patient not taking: Reported on 2021)    [DISCONTINUED] valACYclovir (VALTREX) 1,000 mg tablet Take 2 tablets (2,000 mg total) by mouth 2 (two) times a day for 1 day (Patient not taking: Reported on 9/22/2021)     No current facility-administered medications on file prior to visit  She has No Known Allergies       Review of Systems   Gastrointestinal: Positive for abdominal pain and constipation  Negative for blood in stool, diarrhea, nausea and vomiting  Objective:      /64 (BP Location: Left arm, Patient Position: Sitting, Cuff Size: Adult)   Pulse 61   Wt 59 2 kg (130 lb 9 6 oz)   SpO2 98%   BMI 22 42 kg/m²          Physical Exam  Vitals reviewed  Constitutional:       General: She is not in acute distress  Appearance: She is well-developed  She is not ill-appearing  HENT:      Head: Normocephalic and atraumatic  Abdominal:      General: Bowel sounds are normal       Palpations: There is mass (suprapubic/ rlq )  Tenderness: There is abdominal tenderness in the right lower quadrant  There is no guarding or rebound  Negative signs include Pereira's sign, Rovsing's sign and McBurney's sign  Hernia: No hernia is present  Comments: Estradiol patch present on the RLQ   Skin:     General: Skin is warm  Neurological:      General: No focal deficit present  Mental Status: She is alert and oriented to person, place, and time  Psychiatric:         Mood and Affect: Mood normal  Mood is not anxious or depressed           Behavior: Behavior normal

## 2022-03-21 NOTE — PATIENT INSTRUCTIONS
It was nice meeting you  Believe your pain may be due to abdominal wall strain from heavy lifting/twisting  This can be treated with heat and/or lidocaine patches  For the constipation about recommend fiber and lots of fluids ( 64 fl oz a day)   In addition, dann ordered Colace 100 mg twice a day  You could down titrate to 100 mg once a day for having frequent soft stools    Call if you don't have a BM by Wednesday  Follow up as needed   Dr Jacky Villanueva

## 2022-03-21 NOTE — ASSESSMENT & PLAN NOTE
Pain in the right lower quadrant for 1 week  Exacerbated by bending, twisting, or lifting  Denies urinary symptoms but does report constipation ( bristol type 1)   Start colace 100 mg BID with fiber and lots of fluids   Positive carnette sign indicates abdominal wall pain  May apply heat and take NSAIDs for pain   Call precautions  Asked to call if she doesn't move her bowel by Wednesday

## 2022-03-22 RX ORDER — ESTRADIOL 0.1 MG/D
1 FILM, EXTENDED RELEASE TRANSDERMAL 2 TIMES WEEKLY
Qty: 24 PATCH | Refills: 0 | Status: SHIPPED | OUTPATIENT
Start: 2022-03-24 | End: 2022-06-14

## 2022-05-10 ENCOUNTER — TELEMEDICINE (OUTPATIENT)
Dept: FAMILY MEDICINE CLINIC | Facility: CLINIC | Age: 62
End: 2022-05-10
Payer: COMMERCIAL

## 2022-05-10 ENCOUNTER — NURSE TRIAGE (OUTPATIENT)
Dept: OTHER | Facility: OTHER | Age: 62
End: 2022-05-10

## 2022-05-10 DIAGNOSIS — J01.90 ACUTE NON-RECURRENT SINUSITIS, UNSPECIFIED LOCATION: Primary | ICD-10-CM

## 2022-05-10 PROCEDURE — 99213 OFFICE O/P EST LOW 20 MIN: CPT | Performed by: NURSE PRACTITIONER

## 2022-05-10 PROCEDURE — 1036F TOBACCO NON-USER: CPT | Performed by: NURSE PRACTITIONER

## 2022-05-10 RX ORDER — BENZONATATE 100 MG/1
100 CAPSULE ORAL 3 TIMES DAILY PRN
Qty: 20 CAPSULE | Refills: 0 | Status: SHIPPED | OUTPATIENT
Start: 2022-05-10 | End: 2022-08-09 | Stop reason: ALTCHOICE

## 2022-05-10 RX ORDER — AMOXICILLIN AND CLAVULANATE POTASSIUM 875; 125 MG/1; MG/1
1 TABLET, FILM COATED ORAL EVERY 12 HOURS SCHEDULED
Qty: 14 TABLET | Refills: 0 | Status: SHIPPED | OUTPATIENT
Start: 2022-05-10 | End: 2022-05-17

## 2022-05-10 NOTE — TELEPHONE ENCOUNTER
Reason for Disposition   Using nasal washes and pain medicine > 24 hours and sinus pain (lower forehead, cheekbone, or eye) persists    Answer Assessment - Initial Assessment Questions  1  LOCATION: "Where does it hurt?"       Sinus area, forehead   2  ONSET: "When did the sinus pain start?"  (e g , hours, days)       6 days ago   3  SEVERITY: "How bad is the pain?"   (Scale 1-10; mild, moderate or severe)    - MILD (1-3): doesn't interfere with normal activities     - MODERATE (4-7): interferes with normal activities (e g , work or school) or awakens from sleep    - SEVERE (8-10): excruciating pain and patient unable to do any normal activities         Moderate 7-8 out of 10   4  RECURRENT SYMPTOM: "Have you ever had sinus problems before?" If Yes, ask: "When was the last time?" and "What happened that time?"        Recurrent   5  NASAL CONGESTION: "Is the nose blocked?" If Yes, ask: "Can you open it or must you breathe through the mouth?"      Patient can breath through the nose   6  NASAL DISCHARGE: "Do you have discharge from your nose?" If so ask, "What color?"       Clear   7  FEVER: "Do you have a fever?" If Yes, ask: "What is it, how was it measured, and when did it start?"       No   8  OTHER SYMPTOMS: "Do you have any other symptoms?" (e g , sore throat, cough, earache, difficulty breathing)      Sore throat, headache   9   PREGNANCY: "Is there any chance you are pregnant?" "When was your last menstrual period?"      N/a    Protocols used: SINUS PAIN OR CONGESTION-ADULT-OH

## 2022-05-10 NOTE — TELEPHONE ENCOUNTER
Regarding: sore throat, headache, runny nose and a cough  ----- Message from [facility] sent at 5/10/2022  7:59 AM EDT -----  "I have a sore throat, headache, runny nose and a cough   My covid test was negative "

## 2022-05-10 NOTE — ASSESSMENT & PLAN NOTE
URI symptoms started 5/4, second sickening pattern reported  Afebrile, continues with sinus pressure, cough, rhinorrhea, headache, sore throat  Tolerating PO  Can continue dayquil, nyquil  Will start on augmentin x 7 days, benzonatate prn    Reinforced hydration, pt should call if sx worsen or persist

## 2022-05-10 NOTE — PROGRESS NOTES
Virtual Regular Visit    Verification of patient location:    Patient is located in the following state in which I hold an active license [place]      Assessment/Plan:    Problem List Items Addressed This Visit        Respiratory    Acute non-recurrent sinusitis - Primary     URI symptoms started 5/4, second sickening pattern reported  Afebrile, continues with sinus pressure, cough, rhinorrhea, headache, sore throat  Tolerating PO  Can continue dayquil, nyquil  Will start on augmentin x 7 days, benzonatate prn  Reinforced hydration, pt should call if sx worsen or persist          Relevant Medications    amoxicillin-clavulanate (AUGMENTIN) 875-125 mg per tablet    benzonatate (TESSALON PERLES) 100 mg capsule               Reason for visit is   Chief Complaint   Patient presents with    Virtual Regular Visit        Encounter provider Cezar Stroud, [facility]    Provider located at [address] [place] [id number]      Recent Visits  No visits were found meeting these conditions  Showing recent visits within past 7 days and meeting all other requirements  Today's Visits  Date Type Provider Dept   05/10/22 Telemedicine Mayra Suarez, [address] [place] [address]   Showing today's visits and meeting all other requirements  Future Appointments  No visits were found meeting these conditions  Showing future appointments within next 150 days and meeting all other requirements       The patient was identified by name and date of birth  Crystal Schreiber was informed that this is a telemedicine visit and that the visit is being conducted through [address] [place] [address] Now and patient was informed that this is a secure, HIPAA-compliant platform  She agrees to proceed     My office door was closed  No one else was in the room  She acknowledged consent and understanding of privacy and security of the video platform   The patient has agreed to participate and understands they can discontinue the visit at any time  Patient is aware this is a billable service  Subjective  Katherin Mckoy is a 62 y o  female seen via video   Pt is a 61 yo female seen via video for evaluation of URI symptoms  Onset  with sore throat, headache, cough, sinus headache, rhinorrhea, post nasal drip  She felt she was getting better over the weekend then sx worsened again the last couple of days  She denies fever, chills, no sob  Appetite is normal, denies n/v/d  She took a covid test at home, which was negative  She is taking dayquil and nyquil         Past Medical History:   Diagnosis Date    Conjunctival hemorrhage, right 2019    Ecchymoses, spontaneous     Last assessed 16    Edema     Last assessed 13    H/O total hysterectomy     Impetigo     last assessed 16    Left ear pain 2018       Past Surgical History:   Procedure Laterality Date     SECTION      OOPHORECTOMY Bilateral     age 44    TOTAL ABDOMINAL HYSTERECTOMY      age 44       Current Outpatient Medications   Medication Sig Dispense Refill    Calcium Carb-Cholecalciferol (CALTRATE 600+D) 600-800 MG-UNIT TABS Take 1 tablet by mouth daily      cholecalciferol (VITAMIN D3) 1,000 units tablet Take 1,000 Units by mouth daily      docusate sodium (COLACE) 100 mg capsule Take 1 capsule (100 mg total) by mouth 2 (two) times a day 60 capsule 0    ELDERBERRY PO Take by mouth      estradiol (VIVELLE-DOT) 0 1 MG/24HR Place 1 patch on the skin 2 (two) times a week 24 patch 0    Multiple Vitamin (MULTI-VITAMIN DAILY PO) Take 1 tablet by mouth daily      Omega-3 Fatty Acids (FISH OIL) 1,000 mg Take 1 capsule by mouth daily      Probiotic Product (ACIDOPHILUS XTRA PO) Take 1 tablet by mouth daily      amoxicillin-clavulanate (AUGMENTIN) 875-125 mg per tablet Take 1 tablet by mouth every 12 (twelve) hours for 7 days 14 tablet 0    benzonatate (TESSALON PERLES) 100 mg capsule Take 1 capsule (100 mg total) by mouth 3 (three) times a day as needed for cough 20 capsule 0     No current facility-administered medications for this visit  No Known Allergies    Review of Systems   Constitutional: Negative for appetite change, chills, fatigue and fever  HENT: Positive for congestion, postnasal drip, sinus pressure and sore throat  Negative for ear pain  Respiratory: Positive for cough  Negative for chest tightness, shortness of breath and wheezing  Cardiovascular: Negative for chest pain, palpitations and leg swelling  Gastrointestinal: Negative for diarrhea, nausea and vomiting  Genitourinary: Negative for dysuria  Musculoskeletal: Negative for myalgias  Neurological: Positive for headaches  Negative for dizziness  Hematological: Negative for adenopathy  Psychiatric/Behavioral: Negative for sleep disturbance  Video Exam    There were no vitals filed for this visit  Physical Exam  Constitutional:       General: She is awake  Appearance: Normal appearance  She is well-developed and well-groomed  She is not ill-appearing or diaphoretic  Eyes:      General: Lids are normal       Conjunctiva/sclera: Conjunctivae normal    Pulmonary:      Effort: Pulmonary effort is normal    Skin:     General: Skin is dry  Neurological:      Mental Status: She is alert and oriented to person, place, and time  Psychiatric:         Attention and Perception: Attention normal          Mood and Affect: Mood normal          Speech: Speech normal          Behavior: Behavior normal  Behavior is cooperative  Thought Content:  Thought content normal          Cognition and Memory: Cognition normal          Judgment: Judgment normal           I spent 20 minutes with patient today in which greater than 50% of the time was spent in counseling/coordination of care regarding symptoms, management, fu    VIRTUAL VISIT DISCLAIMER      Elma Davenport verbally agrees to participate in Virtual Care Services  Pt is aware that [employer] could be limited without vital signs or the ability to perform a full hands-on physical exam  Elma Davenport understands she or the provider may request at any time to terminate the video visit and request the patient to seek care or treatment in person

## 2022-05-16 DIAGNOSIS — K59.00 CONSTIPATION, UNSPECIFIED CONSTIPATION TYPE: ICD-10-CM

## 2022-05-16 RX ORDER — DOCUSATE SODIUM 100 MG/1
100 CAPSULE, LIQUID FILLED ORAL 2 TIMES DAILY
Qty: 60 CAPSULE | Refills: 0 | Status: SHIPPED | OUTPATIENT
Start: 2022-05-16

## 2022-06-14 DIAGNOSIS — Z79.890 HORMONE REPLACEMENT THERAPY (HRT): ICD-10-CM

## 2022-06-14 RX ORDER — ESTRADIOL 0.1 MG/D
FILM, EXTENDED RELEASE TRANSDERMAL
Qty: 24 PATCH | Refills: 0 | Status: SHIPPED | OUTPATIENT
Start: 2022-06-14

## 2022-07-18 LAB — SARS-COV-2 AG UPPER RESP QL IA: ABNORMAL

## 2022-07-19 ENCOUNTER — TELEPHONE (OUTPATIENT)
Dept: OTHER | Facility: OTHER | Age: 62
End: 2022-07-19

## 2022-07-19 PROBLEM — U07.1 COVID-19: Status: ACTIVE | Noted: 2022-07-19

## 2022-07-19 NOTE — TELEPHONE ENCOUNTER
Patient called saying that she wants the office to know that she tested positive for covid  Patient stated that she did the test on Monday

## 2022-07-19 NOTE — TELEPHONE ENCOUNTER
Spoke to patient her  tested positive for COVID on Friday  She  tested positive on Monday  She just has a scratchy throat headache no fever  She declines a Virtual visit  I did give her the vitamin regimen  She will try to upload a picture of her results

## 2022-07-22 ENCOUNTER — TELEMEDICINE (OUTPATIENT)
Dept: FAMILY MEDICINE CLINIC | Facility: CLINIC | Age: 62
End: 2022-07-22
Payer: COMMERCIAL

## 2022-07-22 VITALS — TEMPERATURE: 100 F

## 2022-07-22 DIAGNOSIS — U07.1 COVID-19: Primary | ICD-10-CM

## 2022-07-22 PROCEDURE — 3725F SCREEN DEPRESSION PERFORMED: CPT | Performed by: FAMILY MEDICINE

## 2022-07-22 PROCEDURE — 87811 SARS-COV-2 COVID19 W/OPTIC: CPT | Performed by: FAMILY MEDICINE

## 2022-07-22 PROCEDURE — 99213 OFFICE O/P EST LOW 20 MIN: CPT | Performed by: FAMILY MEDICINE

## 2022-07-22 NOTE — PROGRESS NOTES
COVID-19 Outpatient Progress Note    Assessment/Plan:    Marlene Wynne was seen today for virtual brief visit and covid-19  Diagnoses and all orders for this visit:    XRMSC-21  Comments:  day # 3 from onset of symtpoms   continue tylenol and ibuprofen for headaches   hydration and cough medication as needed  not a candidate for antiviral-low risk  Orders: -     [facility] Covid home test flowsheet  -     Covid at Home Test Kit     Disposition:     Patient has COVID-19 infection  Based off CDC guidelines, they were recommended to isolate for 5 days from the date of the positive test  If they remain asymptomatic, isolation may be ended followed by 5 days of wearing a mask when around othes to minimize risk of infecting others  If they have a fever, continue to stay home until fever resolves for at least 24 hours  I recommended continued isolation until at least 24 hours have passed since recovery defined as resolution of fever without the use of fever-reducing medications AND improvement in COVID symptoms AND 10 days have passed since onset of symptoms (or 10 days have passed since date of first positive viral diagnostic test for asymptomatic patients)  I have spent 15 minutes directly with the patient  Encounter provider Lenore Cline MD    Provider located at [address] [address]    Recent Visits  No visits were found meeting these conditions  Showing recent visits within past 7 days and meeting all other requirements  Today's Visits  Date Type Provider Dept   07/22/22 Telemedicine MD Fabio Bourne   Showing today's visits and meeting all other requirements  Future Appointments  No visits were found meeting these conditions    Showing future appointments within next 150 days and meeting all other requirements     This virtual check-in was done via [employer] and patient was informed that this is a secure, HIPAA-compliant platform  She agrees to proceed  Patient agrees to participate in a virtual check in via telephone or video visit instead of presenting to the office to address urgent/immediate medical needs  Patient is aware this is a billable service  After connecting through [facility], the patient was identified by name and date of birth  Norleen Kocher was informed that this was a telemedicine visit and that the exam was being conducted confidentially over secure lines  My office door was closed  No one else was in the room  Norleen Kocher acknowledged consent and understanding of privacy and security of the telemedicine visit  I informed the patient that I have reviewed her record in Epic and presented the opportunity for her to ask any questions regarding the visit today  The patient agreed to participate  Verification of patient location:  Patient is located in the following state in which I hold an active license: [place]    Subjective:   Norleen Kocher is a 62 y o  female who has been screened for COVID-19  Symptom change since last report: unchanged  Patient's symptoms include fever, fatigue, sore throat, cough, myalgias and headache  Patient denies chills, congestion, rhinorrhea, anosmia, loss of taste, shortness of breath, chest tightness, abdominal pain, nausea, vomiting and diarrhea  - Date of symptom onset: 7/19/2022  - Date of positive COVID-19 test: 7/18/2022  Type of test: Home antigen  Patient with typical symptoms of COVID-19 and they attest that they were positive on home rapid antigen testing  Image of positive result is not able to be uploaded into their chart  COVID-19 vaccination status: Fully vaccinated with booster    Chas Morrison has been staying home and has isolated themselves in her home  She is taking care to not share personal items and is cleaning all surfaces that are touched often, like counters, tabletops, and doorknobs using household cleaning sprays or wipes   She is wearing a mask when she leaves her room  Lab Results   Component Value Date    SARSCOV2 Negative 2021    SARSCOV2 Not Detected 2020    [address] Positive (Patient Reported) (A) 2022     Past Medical History:   Diagnosis Date    Conjunctival hemorrhage, right 2019    COVID-19     Ecchymoses, spontaneous     Last assessed 16    Edema     Last assessed 13    H/O total hysterectomy     Impetigo     last assessed 16    Left ear pain 2018     Past Surgical History:   Procedure Laterality Date     SECTION      OOPHORECTOMY Bilateral     age 44    TOTAL ABDOMINAL HYSTERECTOMY      age 44     Current Outpatient Medications   Medication Sig Dispense Refill    benzonatate (TESSALON PERLES) 100 mg capsule Take 1 capsule (100 mg total) by mouth 3 (three) times a day as needed for cough 20 capsule 0    Calcium Carb-Cholecalciferol 600-800 MG-UNIT TABS Take 1 tablet by mouth daily      cholecalciferol (VITAMIN D3) 1,000 units tablet Take 1,000 Units by mouth daily      docusate sodium (COLACE) 100 mg capsule Take 1 capsule (100 mg total) by mouth in the morning and 1 capsule (100 mg total) in the evening  60 capsule 0    ELDERBERRY PO Take by mouth      estradiol (VIVELLE-DOT) 0 1 MG/24HR PLACE 1 PATCH ON THE SKIN 2 TIMES A WEEK  24 patch 0    Multiple Vitamin (MULTI-VITAMIN DAILY PO) Take 1 tablet by mouth daily      Omega-3 Fatty Acids (FISH OIL) 1,000 mg Take 1 capsule by mouth daily      Probiotic Product (ACIDOPHILUS XTRA PO) Take 1 tablet by mouth daily       No current facility-administered medications for this visit  No Known Allergies    Review of Systems   Constitutional: Positive for fatigue and fever  Negative for chills  HENT: Positive for sore throat  Negative for congestion and rhinorrhea  Respiratory: Positive for cough  Negative for chest tightness and shortness of breath      Gastrointestinal: Negative for abdominal pain, diarrhea, nausea and vomiting  Musculoskeletal: Positive for myalgias  Neurological: Positive for headaches  Objective:    Vitals:    07/22/22 0912   Temp: 100 °F (37 8 °C)       Physical Exam  Constitutional:       Appearance: Normal appearance  Pulmonary:      Effort: Pulmonary effort is normal    Neurological:      Mental Status: She is alert  Psychiatric:         Mood and Affect: Mood normal          Behavior: Behavior normal          VIRTUAL VISIT DISCLAIMER    Elma PECK Croaislinnnolan verbally agrees to participate in [employer]  Pt is aware that [employer] could be limited without vital signs or the ability to perform a full hands-on physical exam  Elma Davenport understands she or the provider may request at any time to terminate the video visit and request the patient to seek care or treatment in person

## 2022-08-03 ENCOUNTER — RA CDI HCC (OUTPATIENT)
Dept: OTHER | Facility: HOSPITAL | Age: 62
End: 2022-08-03

## 2022-08-03 NOTE — PROGRESS NOTES
[facility]  coding opportunities       Chart reviewed, no opportunity found: CHART REVIEWED, NO OPPORTUNITY FOUND        Patients Insurance        Commercial Insurance: [address] [place] [address]

## 2022-08-09 ENCOUNTER — OFFICE VISIT (OUTPATIENT)
Dept: FAMILY MEDICINE CLINIC | Facility: CLINIC | Age: 62
End: 2022-08-09
Payer: COMMERCIAL

## 2022-08-09 ENCOUNTER — HOSPITAL ENCOUNTER (OUTPATIENT)
Dept: RADIOLOGY | Age: 62
Discharge: HOME | End: 2022-08-09
Payer: COMMERCIAL

## 2022-08-09 VITALS
WEIGHT: 130 LBS | OXYGEN SATURATION: 98 % | SYSTOLIC BLOOD PRESSURE: 100 MMHG | RESPIRATION RATE: 16 BRPM | DIASTOLIC BLOOD PRESSURE: 64 MMHG | BODY MASS INDEX: 22.2 KG/M2 | HEIGHT: 64 IN | TEMPERATURE: 98.2 F | HEART RATE: 68 BPM

## 2022-08-09 DIAGNOSIS — R10.31 RIGHT GROIN PAIN: Primary | ICD-10-CM

## 2022-08-09 DIAGNOSIS — R10.31 RIGHT GROIN PAIN: ICD-10-CM

## 2022-08-09 PROBLEM — Z86.16 HISTORY OF COVID-19: Status: ACTIVE | Noted: 2022-07-19

## 2022-08-09 PROBLEM — J01.90 ACUTE NON-RECURRENT SINUSITIS: Status: RESOLVED | Noted: 2020-11-24 | Resolved: 2022-08-09

## 2022-08-09 PROCEDURE — 99213 OFFICE O/P EST LOW 20 MIN: CPT | Performed by: FAMILY MEDICINE

## 2022-08-09 PROCEDURE — 76705 ECHO EXAM OF ABDOMEN: CPT

## 2022-08-09 NOTE — PROGRESS NOTES
Assessment/Plan:    1  Right groin pain  Assessment & Plan:  R/o hernia  Discussed Dr Corey Lemon If has hernia    Orders:  -     US groin/inguinal area; Future; Expected date: 08/09/2022           There are no Patient Instructions on file for this visit  No follow-ups on file  Subjective:      Patient ID: Sidra Myers is a 62 y o  female  Chief Complaint   Patient presents with    Abdominal Pain     Patient here with lower right abdominal area        Lower groin right visit  Felt possible constipation  Possible hernia    Abdominal Pain        The following portions of the patient's history were reviewed and updated as appropriate:  past social history    Review of Systems   Gastrointestinal: Positive for abdominal pain  Current Outpatient Medications   Medication Sig Dispense Refill    Biotin w/ Vitamins C & E (HAIR/SKIN/NAILS PO) Take by mouth      Calcium Carb-Cholecalciferol 600-800 MG-UNIT TABS Take 1 tablet by mouth daily      cholecalciferol (VITAMIN D3) 1,000 units tablet Take 1,000 Units by mouth daily      docusate sodium (COLACE) 100 mg capsule Take 1 capsule (100 mg total) by mouth in the morning and 1 capsule (100 mg total) in the evening  60 capsule 0    ELDERBERRY PO Take by mouth      estradiol (VIVELLE-DOT) 0 1 MG/24HR PLACE 1 PATCH ON THE SKIN 2 TIMES A WEEK  24 patch 0    Multiple Vitamin (MULTI-VITAMIN DAILY PO) Take 1 tablet by mouth daily      Omega-3 Fatty Acids (FISH OIL) 1,000 mg Take 1 capsule by mouth daily      Probiotic Product (ACIDOPHILUS XTRA PO) Take 1 tablet by mouth daily       No current facility-administered medications for this visit  Objective:    /64 (BP Location: Left arm, Patient Position: Sitting, Cuff Size: Standard)   Pulse 68   Temp 98 2 °F (36 8 °C) (Tympanic)   Resp 16   Ht 5' 4" (1 626 m)   Wt 59 kg (130 lb)   SpO2 98%   BMI 22 31 kg/m²      Physical Exam  Vitals and nursing note reviewed     Abdominal:      Hernia: A hernia is present  Hernia is present in the right inguinal area (swelling)  Skin:     Capillary Refill: Capillary refill takes less than 2 seconds  Neurological:      General: No focal deficit present               Heidi Jorgensen,

## 2022-08-10 ENCOUNTER — TELEPHONE (OUTPATIENT)
Dept: OTHER | Facility: OTHER | Age: 62
End: 2022-08-10

## 2022-08-10 DIAGNOSIS — R93.89 ABNORMAL ULTRASOUND OF PELVIS: ICD-10-CM

## 2022-08-10 DIAGNOSIS — R10.31 RIGHT GROIN PAIN: Primary | ICD-10-CM

## 2022-08-10 NOTE — TELEPHONE ENCOUNTER
Spoke to patient she is still having pain so yes she would like to see what we can do for it  She not sure if an MRI is the next step or what?

## 2022-09-01 ENCOUNTER — TELEPHONE (OUTPATIENT)
Dept: OTHER | Facility: OTHER | Age: 62
End: 2022-09-01

## 2022-09-01 NOTE — TELEPHONE ENCOUNTER
Patient is calling to request prior authorization be preovided for her upcoming MRI    Please call:  [phone] [facility]

## 2022-09-09 DIAGNOSIS — Z79.890 HORMONE REPLACEMENT THERAPY (HRT): ICD-10-CM

## 2022-09-09 RX ORDER — ESTRADIOL 0.1 MG/D
FILM, EXTENDED RELEASE TRANSDERMAL
Qty: 24 PATCH | Refills: 0 | Status: SHIPPED | OUTPATIENT
Start: 2022-09-09

## 2022-09-15 ENCOUNTER — HOSPITAL ENCOUNTER (OUTPATIENT)
Dept: MRI IMAGING | Facility: HOSPITAL | Age: 62
Discharge: HOME | End: 2022-09-15
Payer: COMMERCIAL

## 2022-09-15 DIAGNOSIS — R10.31 RIGHT GROIN PAIN: ICD-10-CM

## 2022-09-15 DIAGNOSIS — R93.89 ABNORMAL ULTRASOUND OF PELVIS: ICD-10-CM

## 2022-09-15 PROCEDURE — 72197 MRI PELVIS W/O & W/DYE: CPT

## 2022-09-15 PROCEDURE — G1004 CDSM NDSC: HCPCS

## 2022-09-15 PROCEDURE — A9585 GADOBUTROL INJECTION: HCPCS | Performed by: FAMILY MEDICINE

## 2022-09-15 RX ADMIN — GADOBUTROL 6 ML: 604.72 INJECTION INTRAVENOUS at 19:23

## 2022-09-20 DIAGNOSIS — N94.89 ACQUIRED CYST OF CANAL OF NUCK: Primary | ICD-10-CM

## 2022-09-27 ENCOUNTER — CONSULT (OUTPATIENT)
Dept: SURGERY | Facility: CLINIC | Age: 62
End: 2022-09-27
Payer: COMMERCIAL

## 2022-09-27 VITALS
WEIGHT: 130 LBS | HEIGHT: 64 IN | DIASTOLIC BLOOD PRESSURE: 69 MMHG | BODY MASS INDEX: 22.2 KG/M2 | SYSTOLIC BLOOD PRESSURE: 124 MMHG | HEART RATE: 55 BPM

## 2022-09-27 DIAGNOSIS — K40.90 INGUINAL HERNIA: Primary | ICD-10-CM

## 2022-09-27 DIAGNOSIS — N94.89 ACQUIRED CYST OF CANAL OF NUCK: ICD-10-CM

## 2022-09-27 PROCEDURE — 99243 OFF/OP CNSLTJ NEW/EST LOW 30: CPT | Performed by: SURGERY

## 2022-09-27 NOTE — PROGRESS NOTES
Assessment/Plan:  Patient has a history for right lower quadrant abdominal pain over the last 4 months  It is worse with activity improved with rest   Ultrasound and MRI was obtained  Is reveals a cystic lesion in the inguinal canal consistent with a cyst of the canal of Nuck  This is often associated with indirect inguinal herniation  It is male correlate would be a small hydrocele  Examination a 1 cm nodules noted in the right inguinal region  This is tender to palpation  No overt evidence for large herniation  I recommended exploration with removal of the cyst   In addition probable repair of indirect inguinal herniation will be needed  Risks and benefits described in detail  She is agreeable    Diagnoses and all orders for this visit:    Acquired cyst of canal of Nuck  -     Ambulatory Referral to General Surgery        Subjective:      Patient ID: Eileen Camacho is a 62 y o  female  Patient presents for evaluation of RLQ pain  States she has had RLQ pain with activity/lifting for 4 months  Ultrasound groin/inguinal area 8/9/2022   IMPRESSION:  1  No findings for right inguinal hernia  2   In the area of interest at the right groin, there is a elongated cystic structure may be close to the musculature, nonspecific but question related to muscle or tendinous injury  Consider follow-up with MRI  MRI pelvis 9/15/2022  IMPRESSION:  A 2 6 cm mildly septated cystic lesion in the right inguinal canal corresponds to the focus seen on prior ultrasound, and likely represents a canal of Nuck cyst   No suspicious findings             Abdominal Pain  The current episode started more than 1 month ago  The problem occurs intermittently  The problem has been unchanged  The pain is located in the RLQ  The quality of the pain is aching, burning and sharp  The abdominal pain does not radiate  Prior diagnostic workup includes ultrasound         The following portions of the patient's history were reviewed and updated as appropriate:     She  has a past medical history of Conjunctival hemorrhage, right (2019), COVID-19, Ecchymoses, spontaneous, Edema, H/O total hysterectomy, Impetigo, and Left ear pain (2018)  She  has a past surgical history that includes  section; Total abdominal hysterectomy; and Oophorectomy (Bilateral)  Her family history includes Aneurysm in her mother; Breast cancer in her family and paternal aunt; Breast cancer (age of onset: 62) in her sister; COPD in her father, mother, and sister; Cancer in her sister and sister; Coronary artery disease in her mother; Crohn's disease in her family; Diabetes in her mother and paternal grandmother; Emphysema in her father and sister; Heart attack in her father and mother; Hyperlipidemia in her father and mother; Hypertension in her father, mother, and sister; Lung cancer in her family, paternal uncle, and sister; No Known Problems in her brother, paternal aunt, paternal aunt, paternal aunt, son, and son; Stroke in her maternal grandfather and paternal grandfather  She  reports that she has quit smoking  Her smoking use included cigarettes  She has a 20 00 pack-year smoking history  She has never used smokeless tobacco  She reports current alcohol use of about 2 0 standard drinks of alcohol per week  She reports that she does not use drugs  Current Outpatient Medications   Medication Sig Dispense Refill    Biotin w/ Vitamins C & E (HAIR/SKIN/NAILS PO) Take by mouth      Calcium Carb-Cholecalciferol 600-800 MG-UNIT TABS Take 1 tablet by mouth daily      cholecalciferol (VITAMIN D3) 1,000 units tablet Take 1,000 Units by mouth daily      docusate sodium (COLACE) 100 mg capsule Take 1 capsule (100 mg total) by mouth in the morning and 1 capsule (100 mg total) in the evening  60 capsule 0    ELDERBERRY PO Take by mouth      estradiol (VIVELLE-DOT) 0 1 MG/24HR PLACE 1 PATCH ON THE SKIN 2 TIMES A WEEK   24 patch 0    Multiple Vitamin (MULTI-VITAMIN DAILY PO) Take 1 tablet by mouth daily      Omega-3 Fatty Acids (FISH OIL) 1,000 mg Take 1 capsule by mouth daily      Probiotic Product (ACIDOPHILUS XTRA PO) Take 1 tablet by mouth daily       No current facility-administered medications for this visit  She has No Known Allergies       Review of Systems   Constitutional: Negative  Negative for activity change  HENT: Negative  Eyes: Negative  Respiratory: Negative  Cardiovascular: Negative  Gastrointestinal: Positive for abdominal pain  Endocrine: Negative  Genitourinary: Positive for pelvic pain  Musculoskeletal: Negative  Skin: Negative  Allergic/Immunologic: Negative  Neurological: Negative  Psychiatric/Behavioral: Negative for agitation, behavioral problems and confusion  The patient is not nervous/anxious  All other systems reviewed and are negative  Objective:      /69   Pulse 55   Ht 5' 4" (1 626 m)   Wt 59 kg (130 lb)   BMI 22 31 kg/m²          Physical Exam  Constitutional:       Appearance: Normal appearance  She is well-developed  HENT:      Head: Normocephalic and atraumatic  Nose: Nose normal    Eyes:      Extraocular Movements: Extraocular movements intact  Conjunctiva/sclera: Conjunctivae normal    Cardiovascular:      Rate and Rhythm: Normal rate and regular rhythm  Heart sounds: Normal heart sounds  Pulmonary:      Effort: Pulmonary effort is normal       Breath sounds: Normal breath sounds  Abdominal:      General: Abdomen is flat  Musculoskeletal:         General: Normal range of motion  Cervical back: Normal range of motion  Skin:     General: Skin is warm and dry  Comments: 1 cm palpable mass in the right inguinal canal region  Tender to palpation  Does not reduced  Neurological:      Mental Status: She is alert and oriented to person, place, and time     Psychiatric:         Mood and Affect: Mood normal

## 2022-09-28 ENCOUNTER — PREP FOR PROCEDURE (OUTPATIENT)
Dept: SURGERY | Facility: CLINIC | Age: 62
End: 2022-09-28

## 2022-09-28 DIAGNOSIS — K40.90 INGUINAL HERNIA: Primary | ICD-10-CM

## 2022-10-17 ENCOUNTER — OFFICE VISIT (OUTPATIENT)
Dept: FAMILY MEDICINE CLINIC | Facility: CLINIC | Age: 62
End: 2022-10-17
Payer: COMMERCIAL

## 2022-10-17 VITALS
HEART RATE: 50 BPM | HEIGHT: 65 IN | DIASTOLIC BLOOD PRESSURE: 90 MMHG | OXYGEN SATURATION: 100 % | WEIGHT: 132 LBS | SYSTOLIC BLOOD PRESSURE: 140 MMHG | BODY MASS INDEX: 21.99 KG/M2 | RESPIRATION RATE: 14 BRPM | TEMPERATURE: 96.4 F

## 2022-10-17 DIAGNOSIS — Z23 ENCOUNTER FOR IMMUNIZATION: ICD-10-CM

## 2022-10-17 DIAGNOSIS — Z12.31 ENCOUNTER FOR SCREENING MAMMOGRAM FOR MALIGNANT NEOPLASM OF BREAST: ICD-10-CM

## 2022-10-17 DIAGNOSIS — Z00.00 ANNUAL PHYSICAL EXAM: Primary | ICD-10-CM

## 2022-10-17 PROCEDURE — 90471 IMMUNIZATION ADMIN: CPT

## 2022-10-17 PROCEDURE — 90682 RIV4 VACC RECOMBINANT DNA IM: CPT

## 2022-10-17 PROCEDURE — 99396 PREV VISIT EST AGE 40-64: CPT | Performed by: FAMILY MEDICINE

## 2022-10-17 NOTE — PATIENT INSTRUCTIONS

## 2022-10-17 NOTE — PROGRESS NOTES
[address]    NAME: Luan Davenport  AGE: 62 y o  SEX: female  : 1960     DATE: 10/17/2022     Assessment and Plan:     Problem List Items Addressed This Visit        Other    Screening for breast cancer    Relevant Orders    Mammo screening bilateral w 3d & cad    Annual physical exam - Primary     Flu shot today           Other Visit Diagnoses     Encounter for immunization        Relevant Orders    influenza vaccine, quadrivalent, recombinant, PF, 0 5 mL, for patients 18 yr+ (FLUBLOK) (Completed)          Immunizations and preventive care screenings were discussed with patient today  Appropriate education was printed on patient's after visit summary  Counseling:  Dental Health: discussed importance of regular tooth brushing, flossing, and dental visits  Return in 1 year (on 10/17/2023) for Annual physical      Chief Complaint:     Chief Complaint   Patient presents with   • Annual Exam     Patient here for annual wellness exam      History of Present Illness:     Adult Annual Physical   Patient here for a comprehensive physical exam  The patient reports problems - hernia, getting repair in Nov     Diet and Physical Activity  Diet/Nutrition: well balanced diet  Exercise: walking  Depression Screening  PHQ-2/9 Depression Screening         General Health  Sleep: sleeps well  Hearing: normal - bilateral   Vision: no vision problems  Dental: regular dental visits  /GYN Health  Patient is: postmenopausal  Last menstrual period: n/a  Contraceptive method: n/a  Review of Systems:     Review of Systems   Constitutional: Negative  HENT: Negative  Eyes: Negative  Respiratory: Negative  Cardiovascular: Negative  Gastrointestinal: Negative  Endocrine: Negative  Genitourinary: Positive for pelvic pain  Musculoskeletal: Negative  Skin: Negative      Allergic/Immunologic: Negative  Neurological: Negative  Hematological: Negative  Psychiatric/Behavioral: Negative  Past Medical History:     Past Medical History:   Diagnosis Date   • Conjunctival hemorrhage, right 2019   • COVID-19    • Ecchymoses, spontaneous     Last assessed 16   • Edema     Last assessed 13   • H/O total hysterectomy    • Impetigo     last assessed 16   • Left ear pain 2018      Past Surgical History:     Past Surgical History:   Procedure Laterality Date   •  SECTION     • OOPHORECTOMY Bilateral     age 44   • TOTAL ABDOMINAL HYSTERECTOMY      age 44      Social History:     Social History     Socioeconomic History   • Marital status: /Civil Union     Spouse name: None   • Number of children: None   • Years of education: None   • Highest education level: None   Occupational History   • None   Tobacco Use   • Smoking status: Former Smoker     Packs/day: 1 00     Years:  00     Pack years: 20      Types: Cigarettes   • Smokeless tobacco: Never Used   Vaping Use   • Vaping Use: Never used   Substance and Sexual Activity   • Alcohol use:  Yes     Alcohol/week: 2 0 standard drinks     Types: 2 Glasses of wine per week     Comment: social   • Drug use: No   • Sexual activity: Yes     Partners: Male   Other Topics Concern   • None   Social History Narrative   • None     Social Determinants of Health     Financial Resource Strain: Not on file   Food Insecurity: Not on file   Transportation Needs: Not on file   Physical Activity: Not on file   Stress: Not on file   Social Connections: Not on file   Intimate Partner Violence: Not on file   Housing Stability: Not on file      Family History:     Family History   Problem Relation Age of Onset   • Hyperlipidemia Mother    • Hypertension Mother    • Heart attack Mother    • Aneurysm Mother    • COPD Mother    • Coronary artery disease Mother    • Diabetes Mother    • Hyperlipidemia Father    • Hypertension Father • COPD Father    • Heart attack Father    • Emphysema Father    • Breast cancer Sister 62   • Cancer Sister    • COPD Sister    • Emphysema Sister    • Heart attack Sister    • Hypertension Sister    • Lung cancer Sister    • Cancer Sister         Lung   • No Known Problems Brother    • No Known Problems Son    • No Known Problems Son    • Stroke Maternal Grandfather    • Diabetes Paternal Grandmother    • Stroke Paternal Grandfather    • Breast cancer Paternal Aunt         unknown age   • No Known Problems Paternal Aunt    • No Known Problems Paternal Aunt    • No Known Problems Paternal Aunt    • Lung cancer Paternal Uncle         unknown age   • Crohn's disease Family    • Breast cancer Family    • Lung cancer Family       Current Medications:     Current Outpatient Medications   Medication Sig Dispense Refill   • Biotin w/ Vitamins C & E (HAIR/SKIN/NAILS PO) Take by mouth     • Calcium Carb-Cholecalciferol 600-800 MG-UNIT TABS Take 1 tablet by mouth daily     • cholecalciferol (VITAMIN D3) 1,000 units tablet Take 1,000 Units by mouth daily     • docusate sodium (COLACE) 100 mg capsule Take 1 capsule (100 mg total) by mouth in the morning and 1 capsule (100 mg total) in the evening  60 capsule 0   • estradiol (VIVELLE-DOT) 0 1 MG/24HR PLACE 1 PATCH ON THE SKIN 2 TIMES A WEEK  24 patch 0   • Multiple Vitamin (MULTI-VITAMIN DAILY PO) Take 1 tablet by mouth daily     • Omega-3 Fatty Acids (FISH OIL) 1,000 mg Take 1 capsule by mouth daily     • Probiotic Product (ACIDOPHILUS XTRA PO) Take 1 tablet by mouth daily       No current facility-administered medications for this visit  Allergies:     No Known Allergies   Physical Exam:     /90 (BP Location: Left arm, Patient Position: Sitting, Cuff Size: Standard)   Pulse (!) 50   Temp (!) 96 4 °F (35 8 °C) (Tympanic)   Resp 14   Ht 5' 4 53" (1 639 m)   Wt 59 9 kg (132 lb)   SpO2 100%   BMI 22 29 kg/m²     Physical Exam  Vitals and nursing note reviewed  Constitutional:       Appearance: She is well-developed  HENT:      Head: Normocephalic and atraumatic  Right Ear: External ear normal       Left Ear: External ear normal       Nose: Nose normal    Eyes:      Conjunctiva/sclera: Conjunctivae normal       Pupils: Pupils are equal, round, and reactive to light  Cardiovascular:      Rate and Rhythm: Normal rate and regular rhythm  Heart sounds: Normal heart sounds  Pulmonary:      Effort: Pulmonary effort is normal       Breath sounds: Normal breath sounds  Abdominal:      General: Bowel sounds are normal       Palpations: Abdomen is soft  Musculoskeletal:         General: Normal range of motion  Cervical back: Normal range of motion and neck supple  Skin:     General: Skin is warm and dry  Capillary Refill: Capillary refill takes less than 2 seconds  Neurological:      Mental Status: She is alert and oriented to person, place, and time  Psychiatric:         Behavior: Behavior normal          Thought Content:  Thought content normal          Judgment: Judgment normal           Michael Linton DO  [address] [facility]

## 2022-10-21 ENCOUNTER — APPOINTMENT (OUTPATIENT)
Dept: LAB | Facility: AMBULARY SURGERY CENTER | Age: 62
End: 2022-10-21
Attending: SURGERY

## 2022-10-21 ENCOUNTER — OFFICE VISIT (OUTPATIENT)
Dept: LAB | Facility: CLINIC | Age: 62
End: 2022-10-21
Payer: COMMERCIAL

## 2022-10-21 DIAGNOSIS — K40.90 INGUINAL HERNIA: ICD-10-CM

## 2022-10-21 LAB
ALBUMIN SERPL BCP-MCNC: 3.4 G/DL (ref 3.5–5)
ALP SERPL-CCNC: 53 U/L (ref 46–116)
ALT SERPL W P-5'-P-CCNC: 20 U/L (ref 12–78)
ANION GAP SERPL CALCULATED.3IONS-SCNC: 2 MMOL/L (ref 4–13)
AST SERPL W P-5'-P-CCNC: 14 U/L (ref 5–45)
BILIRUB SERPL-MCNC: 0.42 MG/DL (ref 0.2–1)
BUN SERPL-MCNC: 13 MG/DL (ref 5–25)
CALCIUM ALBUM COR SERPL-MCNC: 9.5 MG/DL (ref 8.3–10.1)
CALCIUM SERPL-MCNC: 9 MG/DL (ref 8.3–10.1)
CHLORIDE SERPL-SCNC: 108 MMOL/L (ref 96–108)
CO2 SERPL-SCNC: 29 MMOL/L (ref 21–32)
CREAT SERPL-MCNC: 0.69 MG/DL (ref 0.6–1.3)
ERYTHROCYTE [DISTWIDTH] IN BLOOD BY AUTOMATED COUNT: 13.2 % (ref 11.6–15.1)
GFR SERPL CREATININE-BSD FRML MDRD: 94 ML/MIN/1.73SQ M
GLUCOSE P FAST SERPL-MCNC: 92 MG/DL (ref 65–99)
HCT VFR BLD AUTO: 43 % (ref 34.8–46.1)
HGB BLD-MCNC: 14 G/DL (ref 11.5–15.4)
MCH RBC QN AUTO: 29.4 PG (ref 26.8–34.3)
MCHC RBC AUTO-ENTMCNC: 32.6 G/DL (ref 31.4–37.4)
MCV RBC AUTO: 90 FL (ref 82–98)
PLATELET # BLD AUTO: 249 THOUSANDS/UL (ref 149–390)
PMV BLD AUTO: 11.2 FL (ref 8.9–12.7)
POTASSIUM SERPL-SCNC: 3.9 MMOL/L (ref 3.5–5.3)
PROT SERPL-MCNC: 6.8 G/DL (ref 6.4–8.4)
RBC # BLD AUTO: 4.76 MILLION/UL (ref 3.81–5.12)
SODIUM SERPL-SCNC: 139 MMOL/L (ref 135–147)
WBC # BLD AUTO: 6.54 THOUSAND/UL (ref 4.31–10.16)

## 2022-10-21 PROCEDURE — 93005 ELECTROCARDIOGRAM TRACING: CPT

## 2022-10-22 LAB
ATRIAL RATE: 48 BPM
P AXIS: 60 DEGREES
PR INTERVAL: 184 MS
QRS AXIS: 34 DEGREES
QRSD INTERVAL: 76 MS
QT INTERVAL: 450 MS
QTC INTERVAL: 402 MS
T WAVE AXIS: 50 DEGREES
VENTRICULAR RATE: 48 BPM

## 2022-10-22 PROCEDURE — 93010 ELECTROCARDIOGRAM REPORT: CPT | Performed by: INTERNAL MEDICINE

## 2022-10-24 ENCOUNTER — APPOINTMENT (OUTPATIENT)
Dept: PREADMISSION TESTING | Facility: HOSPITAL | Age: 62
End: 2022-10-24

## 2022-10-27 NOTE — PRE-PROCEDURE INSTRUCTIONS
Pre-Surgery Instructions:   Medication Instructions   • Biotin w/ Vitamins C & E (HAIR/SKIN/NAILS PO) Stop taking 7 days prior to surgery  • Calcium Carb-Cholecalciferol 600-800 MG-UNIT TABS Stop taking 7 days prior to surgery  • cholecalciferol (VITAMIN D3) 1,000 units tablet Stop taking 7 days prior to surgery  • docusate sodium (COLACE) 100 mg capsule Hold day of surgery  • estradiol (VIVELLE-DOT) 0 1 MG/24HR Take day of surgery  • Multiple Vitamin (MULTI-VITAMIN DAILY PO) Stop taking 7 days prior to surgery  • Omega-3 Fatty Acids (FISH OIL) 1,000 mg Stop taking 7 days prior to surgery  • Probiotic Product (ACIDOPHILUS XTRA PO) Stop taking 7 days prior to surgery  Pt  Verbalized understanding of current visitor restrictions  Pre op and bathing instructions reviewed  Pt has hibiclens  Pt  Verbalized an understanding of all instructions reviewed and offers no concerns at this time  Instructed to avoid all ASA/NSAIDs and OTC Vit/Supp from now until after surgery per anesthesia guidelines   Tylenol ok prn

## 2022-10-28 NOTE — DISCHARGE INSTR - AVS FIRST PAGE
Please call the office when you leave to schedule an appointment for 2 weeks  Please call [phone]                      [address], [place], [address]  Off of [address] between [facility] and [facility]  Activity:    May lift 10 lb as many times as desired the 1st week,       20 lb in 2 weeks,       30 lb in 3 weeks  Walking is encouraged  Normal daily activities including climbing steps are okay  Do not engage in strenuous activity ( sit-ups or crutches) or contact sports for 4-6 weeks post-operatively    Return to Work:   Okay to return to work when you feel well if you desire  Diet:   You may return to your normal healthy diet  Wound Care: Your wound is closed with dissolvable stitches and glue  It is okay to shower  Wash incision gently with soap and water and pat dry  Do not soak incisions in bath water or swim for two weeks  Do not apply any creams or ointments  Pain Medication:   Please take as directed if needed  May use Advil or Motrin in addition  Recall, the pain medicine and anesthesia is associated with constipation  No driving while taking narcotic pain medications  Other: It is normal to developed a “healing ridge” / firm incision after surgery  This is your body making scar tissue  It is a good sign  Constipation is very common after general anesthesia  Please use milk of magnesia as needed in order to help prevent constipation  It is normal to get bruising after surgery  If you have questions after discharge please call the office      If you have increased pain, fever >101 5, increased drainage, redness or a bad smell at your surgery site, please call us immediately or come directly to the Emergency Room

## 2022-11-02 ENCOUNTER — HOSPITAL ENCOUNTER (OUTPATIENT)
Facility: HOSPITAL | Age: 62
Setting detail: OUTPATIENT SURGERY
Discharge: HOME | End: 2022-11-02
Attending: SURGERY | Admitting: SURGERY

## 2022-11-02 ENCOUNTER — ANESTHESIA EVENT (OUTPATIENT)
Dept: PERIOP | Facility: HOSPITAL | Age: 62
End: 2022-11-02

## 2022-11-02 ENCOUNTER — ANESTHESIA (OUTPATIENT)
Dept: PERIOP | Facility: HOSPITAL | Age: 62
End: 2022-11-02

## 2022-11-02 VITALS
OXYGEN SATURATION: 98 % | TEMPERATURE: 97.4 F | BODY MASS INDEX: 22.53 KG/M2 | RESPIRATION RATE: 16 BRPM | HEART RATE: 52 BPM | DIASTOLIC BLOOD PRESSURE: 57 MMHG | SYSTOLIC BLOOD PRESSURE: 112 MMHG | WEIGHT: 132 LBS | HEIGHT: 64 IN

## 2022-11-02 DIAGNOSIS — K40.90 INGUINAL HERNIA: Primary | ICD-10-CM

## 2022-11-02 DEVICE — BARD MESH
Type: IMPLANTABLE DEVICE | Status: FUNCTIONAL
Brand: BARD MESH

## 2022-11-02 RX ORDER — MIDAZOLAM HYDROCHLORIDE 2 MG/2ML
INJECTION, SOLUTION INTRAMUSCULAR; INTRAVENOUS AS NEEDED
Status: DISCONTINUED | OUTPATIENT
Start: 2022-11-02 | End: 2022-11-02

## 2022-11-02 RX ORDER — HYDROMORPHONE HCL/PF 1 MG/ML
0.5 SYRINGE (ML) INJECTION
Status: DISCONTINUED | OUTPATIENT
Start: 2022-11-02 | End: 2022-11-02 | Stop reason: HOSPADM

## 2022-11-02 RX ORDER — HYDROCODONE BITARTRATE AND ACETAMINOPHEN 5; 325 MG/1; MG/1
1 TABLET ORAL EVERY 6 HOURS PRN
Qty: 6 TABLET | Refills: 0 | Status: SHIPPED | OUTPATIENT
Start: 2022-11-02 | End: 2022-11-06

## 2022-11-02 RX ORDER — MAGNESIUM HYDROXIDE 1200 MG/15ML
LIQUID ORAL AS NEEDED
Status: DISCONTINUED | OUTPATIENT
Start: 2022-11-02 | End: 2022-11-02 | Stop reason: HOSPADM

## 2022-11-02 RX ORDER — FENTANYL CITRATE 50 UG/ML
INJECTION, SOLUTION INTRAMUSCULAR; INTRAVENOUS AS NEEDED
Status: DISCONTINUED | OUTPATIENT
Start: 2022-11-02 | End: 2022-11-02

## 2022-11-02 RX ORDER — PROPOFOL 10 MG/ML
INJECTION, EMULSION INTRAVENOUS AS NEEDED
Status: DISCONTINUED | OUTPATIENT
Start: 2022-11-02 | End: 2022-11-02

## 2022-11-02 RX ORDER — EPHEDRINE SULFATE 50 MG/ML
INJECTION INTRAVENOUS AS NEEDED
Status: DISCONTINUED | OUTPATIENT
Start: 2022-11-02 | End: 2022-11-02

## 2022-11-02 RX ORDER — HYDROMORPHONE HCL/PF 1 MG/ML
0.4 SYRINGE (ML) INJECTION
Status: DISCONTINUED | OUTPATIENT
Start: 2022-11-02 | End: 2022-11-02 | Stop reason: HOSPADM

## 2022-11-02 RX ORDER — DEXAMETHASONE SODIUM PHOSPHATE 10 MG/ML
INJECTION, SOLUTION INTRAMUSCULAR; INTRAVENOUS AS NEEDED
Status: DISCONTINUED | OUTPATIENT
Start: 2022-11-02 | End: 2022-11-02

## 2022-11-02 RX ORDER — OXYCODONE HYDROCHLORIDE AND ACETAMINOPHEN 5; 325 MG/1; MG/1
1 TABLET ORAL EVERY 4 HOURS PRN
Status: DISCONTINUED | OUTPATIENT
Start: 2022-11-02 | End: 2022-11-02 | Stop reason: HOSPADM

## 2022-11-02 RX ORDER — BUPIVACAINE HYDROCHLORIDE AND EPINEPHRINE 2.5; 5 MG/ML; UG/ML
INJECTION, SOLUTION INFILTRATION; PERINEURAL AS NEEDED
Status: DISCONTINUED | OUTPATIENT
Start: 2022-11-02 | End: 2022-11-02 | Stop reason: HOSPADM

## 2022-11-02 RX ORDER — ONDANSETRON 2 MG/ML
4 INJECTION INTRAMUSCULAR; INTRAVENOUS EVERY 4 HOURS PRN
Status: DISCONTINUED | OUTPATIENT
Start: 2022-11-02 | End: 2022-11-02 | Stop reason: HOSPADM

## 2022-11-02 RX ORDER — ACETAMINOPHEN 325 MG/1
650 TABLET ORAL EVERY 4 HOURS PRN
Status: DISCONTINUED | OUTPATIENT
Start: 2022-11-02 | End: 2022-11-02 | Stop reason: HOSPADM

## 2022-11-02 RX ORDER — CEFAZOLIN SODIUM 1 G/50ML
1000 SOLUTION INTRAVENOUS ONCE
Status: DISCONTINUED | OUTPATIENT
Start: 2022-11-02 | End: 2022-11-02 | Stop reason: HOSPADM

## 2022-11-02 RX ORDER — CEFAZOLIN SODIUM 2 G/50ML
SOLUTION INTRAVENOUS AS NEEDED
Status: DISCONTINUED | OUTPATIENT
Start: 2022-11-02 | End: 2022-11-02

## 2022-11-02 RX ORDER — KETOROLAC TROMETHAMINE 30 MG/ML
INJECTION, SOLUTION INTRAMUSCULAR; INTRAVENOUS AS NEEDED
Status: DISCONTINUED | OUTPATIENT
Start: 2022-11-02 | End: 2022-11-02

## 2022-11-02 RX ORDER — SODIUM CHLORIDE, SODIUM LACTATE, POTASSIUM CHLORIDE, CALCIUM CHLORIDE 600; 310; 30; 20 MG/100ML; MG/100ML; MG/100ML; MG/100ML
INJECTION, SOLUTION INTRAVENOUS CONTINUOUS PRN
Status: DISCONTINUED | OUTPATIENT
Start: 2022-11-02 | End: 2022-11-02

## 2022-11-02 RX ORDER — ONDANSETRON 2 MG/ML
INJECTION INTRAMUSCULAR; INTRAVENOUS AS NEEDED
Status: DISCONTINUED | OUTPATIENT
Start: 2022-11-02 | End: 2022-11-02

## 2022-11-02 RX ORDER — LIDOCAINE HYDROCHLORIDE 10 MG/ML
INJECTION, SOLUTION EPIDURAL; INFILTRATION; INTRACAUDAL; PERINEURAL AS NEEDED
Status: DISCONTINUED | OUTPATIENT
Start: 2022-11-02 | End: 2022-11-02

## 2022-11-02 RX ADMIN — PROPOFOL 150 MG: 10 INJECTION, EMULSION INTRAVENOUS at 08:42

## 2022-11-02 RX ADMIN — KETOROLAC TROMETHAMINE 15 MG: 30 INJECTION, SOLUTION INTRAMUSCULAR at 09:05

## 2022-11-02 RX ADMIN — SODIUM CHLORIDE, SODIUM LACTATE, POTASSIUM CHLORIDE, AND CALCIUM CHLORIDE: .6; .31; .03; .02 INJECTION, SOLUTION INTRAVENOUS at 08:38

## 2022-11-02 RX ADMIN — FENTANYL CITRATE 25 MCG: 50 INJECTION INTRAMUSCULAR; INTRAVENOUS at 08:49

## 2022-11-02 RX ADMIN — ONDANSETRON 4 MG: 2 INJECTION INTRAMUSCULAR; INTRAVENOUS at 09:23

## 2022-11-02 RX ADMIN — MIDAZOLAM 2 MG: 1 INJECTION INTRAMUSCULAR; INTRAVENOUS at 08:34

## 2022-11-02 RX ADMIN — LIDOCAINE HYDROCHLORIDE 50 MG: 10 INJECTION, SOLUTION EPIDURAL; INFILTRATION; INTRACAUDAL; PERINEURAL at 08:42

## 2022-11-02 RX ADMIN — EPHEDRINE SULFATE 10 MG: 50 INJECTION, SOLUTION INTRAVENOUS at 09:24

## 2022-11-02 RX ADMIN — CEFAZOLIN SODIUM 1000 MG: 2 SOLUTION INTRAVENOUS at 08:46

## 2022-11-02 RX ADMIN — DEXAMETHASONE SODIUM PHOSPHATE 10 MG: 10 INJECTION, SOLUTION INTRAMUSCULAR; INTRAVENOUS at 08:42

## 2022-11-02 RX ADMIN — EPHEDRINE SULFATE 5 MG: 50 INJECTION, SOLUTION INTRAVENOUS at 09:15

## 2022-11-02 RX ADMIN — FENTANYL CITRATE 25 MCG: 50 INJECTION INTRAMUSCULAR; INTRAVENOUS at 09:12

## 2022-11-02 RX ADMIN — EPHEDRINE SULFATE 5 MG: 50 INJECTION, SOLUTION INTRAVENOUS at 09:22

## 2022-11-02 NOTE — OP NOTE
OPERATIVE REPORT  PATIENT NAME: Paulette Vogel    :  1960  MRN: [id number]  Pt Location: [facility] OR ROOM 04    SURGERY DATE: 2022    Surgeon(s) and Role:     * Alannah Flores MD - Primary     * Star Holman MD - Assisting    Preop Diagnosis:  Inguinal hernia [K40 90]    Post-Op Diagnosis Codes:     * Inguinal hernia [K40 90]    Procedure(s) (LRB):  REPAIR HERNIA INGUINALWITH MESH (Right)    Specimen(s):  * No specimens in log *    Estimated Blood Loss:   Minimal    Drains:  * No LDAs found *    Anesthesia Type:   General    Operative Indications:  Inguinal hernia [K75 54]          Complications:   None    Procedure and Technique:  Paulette Vogel is a 62 y o  female was brought into the operative suite and identified visually and by arm band  The patient was placed in the supine position  Careful attention was made towards padding and positioning of the extremities  After sterile prep and drape, a timeout was completed  The prep was dry  Antibiotics were provided  Athrombic pumps in place  0 25% Marcaine with epinephrine was utilized throughout the procedure  An incision was made  within the right inguinal region  The incision was carried down through the skin and subcutaneous tissue  The superficial epigastric vein was clamped, ligated and  divided    The external oblique fibers were identified  The external ring was identified  The external oblique fibers were then split in the direction of their fibers  Hemostats were placed on the cut edges  A self-retaining retractor was then placed  Exploration of the inguinal canal commenced  The cremasteric fibers were then divided along the fiber direction of its fibers the cord structures  Identification of a indirect  inguinal hernia was performed  High dissection was completed at the level of the internal ring  Preperitoneal dissection commenced identifying and preserving the inferior epigastric vessels      A preperitoneal mesh was then inserted  The branch of the genitofemoral nerve was divided in order to help prevent postoperative neuralgia  The inguinal floor was then repaired with interrupted figure-of-eight 0 Vicryl suture  The indirect inguinal ring was repositioned more superiorly while repairing the inguinal transversalis muscular floor  An onlay mesh was then secured to the tendon overlying the lateral pubic tubercle The external oblique fascia was closed with a running 3-0 PDS suture  Additional 0 25% Marcaine with epinephrine was injected over the ilioinguinal and iliohypogastric nerves  3-0 Vicryl subcutaneous suture was placed into the Joanne's fascia   4-0 Monocryl suture was used for the subcuticular layer  Dermabond was then applied  The patient was then awakened from general anesthesia and transferred to the recovery room in stable condition  Sponge and instrument count correct ×2       I was present for the entire procedure    Patient Disposition:  PACU         SIGNATURE: Nicanor Fowler MD  DATE: November 2, 2022  TIME: 9:56 AM

## 2022-11-02 NOTE — ANESTHESIA PREPROCEDURE EVALUATION
Procedure:  REPAIR HERNIA INGUINAL (Right Groin)    Relevant Problems   NEURO/PSYCH   (+) History of COVID-19        Physical Exam    Airway    Mallampati score: III  TM Distance: >3 FB  Neck ROM: full     Dental   No notable dental hx     Cardiovascular  Cardiovascular exam normal    Pulmonary  Pulmonary exam normal     Other Findings        Anesthesia Plan  ASA Score- 2     Anesthesia Type- general with ASA Monitors  Additional Monitors:   Airway Plan: LMA  Plan Factors-Exercise tolerance (METS): >4 METS  Chart reviewed  EKG reviewed  Existing labs reviewed  Patient summary reviewed  Patient is not a current smoker  Induction- intravenous  Postoperative Plan- Plan for postoperative opioid use  Informed Consent- Anesthetic plan and risks discussed with patient  I personally reviewed this patient with the CRNA  Discussed and agreed on the Anesthesia Plan with the CRNA  Juventino Romero

## 2022-11-02 NOTE — ANESTHESIA POSTPROCEDURE EVALUATION
Post-Op Assessment Note    CV Status:  Stable  Pain Score: 0    Pain management: adequate     Mental Status:  Sleepy   PONV Controlled:  None   Airway Patency:  Patent      Post Op Vitals Reviewed: Yes      Staff: Anesthesiologist         No complications documented      BP   115/58   Temp 97 5   Pulse 65   Resp 12   SpO2 100

## 2022-11-02 NOTE — PROGRESS NOTES
[id number] [address] A Crouthamel 62 y o  female MRN: [id number]  Unit/Bed#: OR [place] Encounter: [id number]          ASSESSMENT: right inguinal hernia- cyst on canal; of Nuck     PLAN: right inguinal hernia repair      Review of Systems  Constitutional:  Denies fever or chills   Eyes:  Denies change in visual acuity   HENT:  Denies nasal congestion or sore throat   Respiratory:  Denies cough or shortness of breath   Cardiovascular:  Denies chest pain or edema   GI:  Denies abdominal pain, nausea, vomiting, bloody stools or diarrhea   Musculoskeletal:  Denies back pain or joint pain   Integument:  Denies rash   Neurologic:  Denies headache, focal weakness or sensory changes   Endocrine:  Denies polyuria or polydipsia   Lymphatic:  Denies swollen glands   Psychiatric:  Denies depression or anxiety     Historical Information   Past Medical History:   Diagnosis Date   • Conjunctival hemorrhage, right 2019   • COVID 2022   • COVID-19    • Ecchymoses, spontaneous     Last assessed 16   • Edema     Last assessed 13   • H/O total hysterectomy    • Impetigo     last assessed 16   • Left ear pain 2018     Past Surgical History:   Procedure Laterality Date   •  SECTION     • OOPHORECTOMY Bilateral     age 44   • TOTAL ABDOMINAL HYSTERECTOMY      age 44     Social History   Social History     Substance and Sexual Activity   Alcohol Use Yes   • Alcohol/week: 2 0 standard drinks   • Types: 2 Glasses of wine per week    Comment: social-once per week     Social History     Substance and Sexual Activity   Drug Use No     Social History     Tobacco Use   Smoking Status Former Smoker   • Packs/day: 1 00   • Years: 20 00   • Pack years: 20 00   • Types: Cigarettes   • Quit date:    • Years since quittin 8   Smokeless Tobacco Never Used     Family History   Problem Relation Age of Onset   • Hyperlipidemia Mother    • Hypertension Mother    • Heart attack Mother    • Aneurysm Mother    • COPD Mother    • Coronary artery disease Mother    • Diabetes Mother    • Hyperlipidemia Father    • Hypertension Father    • COPD Father    • Heart attack Father    • Emphysema Father    • Breast cancer Sister 62   • Cancer Sister    • COPD Sister    • Emphysema Sister    • Heart attack Sister    • Hypertension Sister    • Lung cancer Sister    • Cancer Sister         Lung   • No Known Problems Brother    • No Known Problems Son    • No Known Problems Son    • Stroke Maternal Grandfather    • Diabetes Paternal Grandmother    • Stroke Paternal Grandfather    • Breast cancer Paternal Aunt         unknown age   • No Known Problems Paternal Aunt    • No Known Problems Paternal Aunt    • No Known Problems Paternal Aunt    • Lung cancer Paternal Uncle         unknown age   • Crohn's disease Family    • Breast cancer Family    • Lung cancer Family        Meds/Allergies     Medications Prior to Admission   Medication   • Biotin w/ Vitamins C & E (HAIR/SKIN/NAILS PO)   • Calcium Carb-Cholecalciferol 600-800 MG-UNIT TABS   • cholecalciferol (VITAMIN D3) 1,000 units tablet   • docusate sodium (COLACE) 100 mg capsule   • estradiol (VIVELLE-DOT) 0 1 MG/24HR   • Multiple Vitamin (MULTI-VITAMIN DAILY PO)   • Omega-3 Fatty Acids (FISH OIL) 1,000 mg   • Probiotic Product (ACIDOPHILUS XTRA PO)     Current Facility-Administered Medications   Medication Dose Route Frequency   • ceFAZolin (ANCEF) IVPB (premix in dextrose) 1,000 mg 50 mL  1,000 mg Intravenous Once       No Known Allergies    Objective     Blood pressure 124/62, pulse 61, temperature 98 5 °F (36 9 °C), temperature source Temporal, resp  rate 14, height 5' 4" (1 626 m), weight 59 9 kg (132 lb), SpO2 98 %, not currently breastfeeding      No intake or output data in the 24 hours ending 11/02/22 0816    PHYSICAL EXAM  General appearance: alert and oriented, in no acute distress  Lungs: clear to auscultation bilaterally  Heart: regular rate and rhythm, S1, S2 normal, no murmur, click, rub or gallop  Abdomen: soft, non-tender; bowel sounds normal; no masses,  no organomegaly  Skin: Skin color, texture, turgor normal  No rashes or lesions  Right inguinal mass       Lab Results:   No visits with results within 1 Day(s) from this visit  Latest known visit with results is:   Office Visit on 10/21/2022   Component Date Value   • Ventricular Rate 10/21/2022 48    • Atrial Rate 10/21/2022 48    • MT Interval 10/21/2022 184    • QRSD Interval 10/21/2022 76    • QT Interval 10/21/2022 450    • QTC Interval 10/21/2022 402    • P Axis 10/21/2022 60    • QRS Axis 10/21/2022 34    • T Wave Axis 10/21/2022 50      Imaging Studies: I have personally reviewed pertinent reports  No results found  Counseling / Coordination of Care  Total time spent today  15 minutes  Greater than 50% of total time was spent with the patient and / or family counseling and / or coordination of care

## 2022-11-15 ENCOUNTER — OFFICE VISIT (OUTPATIENT)
Dept: SURGERY | Facility: CLINIC | Age: 62
End: 2022-11-15

## 2022-11-15 DIAGNOSIS — K40.90 INGUINAL HERNIA: Primary | ICD-10-CM

## 2022-11-15 NOTE — PROGRESS NOTES
Assessment/Plan:  Patient is status post inguinal hernia repair  She feels well  She offers no complaints  She is increased her activities nicely  Incision is clean and healing well  All questions answered  There are no diagnoses linked to this encounter  Postoperative restrictions reviewed  All questions answered  ______________________________________________________  HPI: Patient presents post operatively  Right inguinal hernia repair with mesh 11/2/2022  Patient Active Problem List   Diagnosis   • Adenomatous colon polyp   • Well adult exam   • Screening for breast cancer   • Annual physical exam   • Bradycardia   • Constipation   • Abdominal wall pain in right lower quadrant   • History of COVID-19   • Right groin pain   • Inguinal hernia   • Acquired cyst of canal of Nuck       Allergies:  Patient has no known allergies        Current Outpatient Medications:   •  Biotin w/ Vitamins C & E (HAIR/SKIN/NAILS PO), Take by mouth, Disp: , Rfl:   •  Calcium Carb-Cholecalciferol 600-800 MG-UNIT TABS, Take 1 tablet by mouth daily, Disp: , Rfl:   •  cholecalciferol (VITAMIN D3) 1,000 units tablet, Take 1,000 Units by mouth daily, Disp: , Rfl:   •  docusate sodium (COLACE) 100 mg capsule, Take 1 capsule (100 mg total) by mouth in the morning and 1 capsule (100 mg total) in the evening , Disp: 60 capsule, Rfl: 0  •  estradiol (VIVELLE-DOT) 0 1 MG/24HR, PLACE 1 PATCH ON THE SKIN 2 TIMES A WEEK , Disp: 24 patch, Rfl: 0  •  Multiple Vitamin (MULTI-VITAMIN DAILY PO), Take 1 tablet by mouth daily, Disp: , Rfl:   •  Omega-3 Fatty Acids (FISH OIL) 1,000 mg, Take 1 capsule by mouth daily, Disp: , Rfl:   •  Probiotic Product (ACIDOPHILUS XTRA PO), Take 1 tablet by mouth daily, Disp: , Rfl:     Past Medical History:   Diagnosis Date   • Conjunctival hemorrhage, right 09/17/2019   • COVID 04/2022   • COVID-19    • Ecchymoses, spontaneous     Last assessed 12/05/16   • Edema     Last assessed 02/26/13   • H/O total hysterectomy    • Impetigo     last assessed 16   • Left ear pain 2018       Past Surgical History:   Procedure Laterality Date   •  SECTION     • OOPHORECTOMY Bilateral     age 44   • NH REPAIR ING HERNIA,5+Y/O,REDUCIBL Right 2022    Procedure: [address];  Surgeon: Blaine Nolasco MD;  Location: [facility];  Service: General   • TOTAL ABDOMINAL HYSTERECTOMY      age 44       Family History   Problem Relation Age of Onset   • Hyperlipidemia Mother    • Hypertension Mother    • Heart attack Mother    • Aneurysm Mother    • COPD Mother    • Coronary artery disease Mother    • Diabetes Mother    • Hyperlipidemia Father    • Hypertension Father    • COPD Father    • Heart attack Father    • Emphysema Father    • Breast cancer Sister 62   • Cancer Sister    • COPD Sister    • Emphysema Sister    • Heart attack Sister    • Hypertension Sister    • Lung cancer Sister    • Cancer Sister         Lung   • No Known Problems Brother    • No Known Problems Son    • No Known Problems Son    • Stroke Maternal Grandfather    • Diabetes Paternal Grandmother    • Stroke Paternal Grandfather    • Breast cancer Paternal Aunt         unknown age   • No Known Problems Paternal Aunt    • No Known Problems Paternal Aunt    • No Known Problems Paternal Aunt    • Lung cancer Paternal Uncle         unknown age   • Crohn's disease Family    • Breast cancer Family    • Lung cancer Family         reports that she quit smoking about 22 years ago  Her smoking use included cigarettes  She has a 20 00 pack-year smoking history  She has never used smokeless tobacco  She reports current alcohol use of about 2 0 standard drinks of alcohol per week  She reports that she does not use drugs  PHYSICAL EXAM    There were no vitals taken for this visit      General: normal, cooperative, no distress  Abdominal: soft, nondistended or nontender  Incision: clean, dry, and intact and healing blaine Andersen MD    Date: 11/15/2022 Time: 2:29 PM

## 2022-11-25 ENCOUNTER — TELEMEDICINE (OUTPATIENT)
Dept: FAMILY MEDICINE CLINIC | Facility: CLINIC | Age: 62
End: 2022-11-25

## 2022-11-25 ENCOUNTER — NURSE TRIAGE (OUTPATIENT)
Dept: OTHER | Facility: OTHER | Age: 62
End: 2022-11-25

## 2022-11-25 VITALS — TEMPERATURE: 100.1 F

## 2022-11-25 DIAGNOSIS — J06.9 UPPER RESPIRATORY TRACT INFECTION, UNSPECIFIED TYPE: Primary | ICD-10-CM

## 2022-11-25 RX ORDER — GUAIFENESIN AND CODEINE PHOSPHATE 100; 10 MG/5ML; MG/5ML
5 SOLUTION ORAL 3 TIMES DAILY PRN
Qty: 118 ML | Refills: 0 | Status: SHIPPED | OUTPATIENT
Start: 2022-11-25

## 2022-11-25 RX ORDER — AZITHROMYCIN 250 MG/1
TABLET, FILM COATED ORAL
Qty: 6 TABLET | Refills: 0 | Status: SHIPPED | OUTPATIENT
Start: 2022-11-25 | End: 2022-11-29

## 2022-11-25 NOTE — TELEPHONE ENCOUNTER
Regarding: fever 100, cough, nasal congestion/runny nose  ----- Message from Vicki Manrique sent at 11/25/2022  8:31 AM EST -----  "I have a fever of 100, cough, nasal congestion and runny; I have been sick for a few days  "

## 2022-11-25 NOTE — PROGRESS NOTES
COVID-19 Outpatient Progress Note    Assessment/Plan:    Problem List Items Addressed This Visit        Respiratory    Upper respiratory tract infection - Primary     Onset 11/22, productive cough, nasal congestion, rhinorrhea, headache, fatigue, tmax 100 4   had sx last week, neg for covid/flu and improved with abx  She is not getting relief with benzonatate, nyquil, apap  Neg yesterday for covid  Denies sob, n/v/d  Cough causing pain due to recent hernia surgery  Given her husbands sx/tx will treat with azithromycin, prn cheratussin pm for cough  Encouraged hydration  Pt instructed to call for reevaluation if sx worsen or persist            Relevant Medications    azithromycin (ZITHROMAX) 250 mg tablet    guaifenesin-codeine (GUAIFENESIN AC) 100-10 MG/5ML liquid      Disposition:     After clarifying the patient's history, my suspicion for COVID-19 infection is very low  Discussed symptom directed medication options with patient  I have spent 15 minutes directly with the patient  Greater than 50% of this time was spent in counseling/coordination of care regarding: diagnostic results, prognosis, risks and benefits of treatment options, instructions for management, patient and family education, importance of treatment compliance, risk factor reductions and impressions  Encounter provider: ENMA Qiu     Provider located at: [address] [address]     Recent Visits  No visits were found meeting these conditions  Showing recent visits within past 7 days and meeting all other requirements  Today's Visits  Date Type Provider Dept   11/25/22 Telemedicine Mayra Marie, [facility]   Showing today's visits and meeting all other requirements  Future Appointments  No visits were found meeting these conditions    Showing future appointments within next 150 days and meeting all other requirements This virtual check-in was done via [address] and patient was informed that this is a secure, HIPAA-compliant platform  She agrees to proceed  Patient agrees to participate in a virtual check in via telephone or video visit instead of presenting to the office to address urgent/immediate medical needs  Patient is aware this is a billable service  She acknowledged consent and understanding of privacy and security of the video platform  The patient has agreed to participate and understands they can discontinue the visit at any time  After connecting through [facility], the patient was identified by name and date of birth  Hugo Dean was informed that this was a telemedicine visit and that the exam was being conducted confidentially over secure lines  My office door was closed  No one else was in the room  Hugo Dean acknowledged consent and understanding of privacy and security of the telemedicine visit  I informed the patient that I have reviewed her record in Epic and presented the opportunity for her to ask any questions regarding the visit today  The patient agreed to participate  Verification of patient location:  Patient is located in the following state in which I hold an active license: [place]    Subjective:   Hugo Dean is a 62 y o  female who is concerned about COVID-19  Patient's symptoms include fatigue, nasal congestion, rhinorrhea, cough and headache  Patient denies fever, chills, malaise, sore throat, anosmia, loss of taste, shortness of breath, chest tightness, abdominal pain, nausea, vomiting, diarrhea and myalgias       - Date of symptom onset: 11/22/2022      COVID-19 vaccination status: Fully vaccinated with booster    Exposure:   Contact with a person who is under investigation (PUI) for or who is positive for COVID-19 within the last 14 days?: No    Hospitalized recently for fever and/or lower respiratory symptoms?: No      Currently a healthcare worker that is involved in direct patient care?: No      Works in a special setting where the risk of COVID-19 transmission may be high? (this may include long-term care, correctional and longterm facilities; homeless shelters; assisted-living facilities and group homes ): No      Resident in a special setting where the risk of COVID-19 transmission may be high? (this may include long-term care, correctional and longterm facilities; homeless shelters; assisted-living facilities and group homes ): No      Lab Results   Component Value Date    SARSCOV2 Negative 04/23/2021    Mónica Harry Not Detected 11/24/2020    [address] Positive (Patient Reported) (A) 07/18/2022       Review of Systems   Constitutional: Positive for fatigue  Negative for chills and fever  HENT: Positive for congestion and rhinorrhea  Negative for sore throat  Respiratory: Positive for cough  Negative for chest tightness and shortness of breath  Gastrointestinal: Negative for abdominal pain, diarrhea, nausea and vomiting  Musculoskeletal: Negative for myalgias  Neurological: Positive for headaches  Current Outpatient Medications on File Prior to Visit   Medication Sig   • Biotin w/ Vitamins C & E (HAIR/SKIN/NAILS PO) Take by mouth   • Calcium Carb-Cholecalciferol 600-800 MG-UNIT TABS Take 1 tablet by mouth daily   • cholecalciferol (VITAMIN D3) 1,000 units tablet Take 1,000 Units by mouth daily   • docusate sodium (COLACE) 100 mg capsule Take 1 capsule (100 mg total) by mouth in the morning and 1 capsule (100 mg total) in the evening  • estradiol (VIVELLE-DOT) 0 1 MG/24HR PLACE 1 PATCH ON THE SKIN 2 TIMES A WEEK  • Multiple Vitamin (MULTI-VITAMIN DAILY PO) Take 1 tablet by mouth daily   • Omega-3 Fatty Acids (FISH OIL) 1,000 mg Take 1 capsule by mouth daily   • Probiotic Product (ACIDOPHILUS XTRA PO) Take 1 tablet by mouth daily       Objective:    Temp 100 1 °F (37 8 °C) (Tympanic)      Physical Exam  Vitals reviewed     Constitutional: General: She is awake  She is not in acute distress  Appearance: Normal appearance  She is well-developed, well-groomed and well-nourished  She is not ill-appearing  Eyes:      General: Lids are normal       Conjunctiva/sclera: Conjunctivae normal    Pulmonary:      Effort: Pulmonary effort is normal  No tachypnea, accessory muscle usage or respiratory distress  Neurological:      Mental Status: She is alert and oriented to person, place, and time  Psychiatric:         Attention and Perception: Attention normal          Mood and Affect: Mood and affect and mood normal          Speech: Speech normal          Behavior: Behavior normal  Behavior is cooperative  Thought Content:  Thought content normal          Cognition and Memory: Cognition and memory normal          Judgment: Judgment normal        ENMA Simon

## 2022-11-25 NOTE — ASSESSMENT & PLAN NOTE
Onset 11/22, productive cough, nasal congestion, rhinorrhea, headache, fatigue, tmax 100 4   had sx last week, neg for covid/flu and improved with abx  She is not getting relief with benzonatate, nyquil, apap  Neg yesterday for covid  Denies sob, n/v/d  Cough causing pain due to recent hernia surgery  Given her husbands sx/tx will treat with azithromycin, prn cheratussin pm for cough  Encouraged hydration    Pt instructed to call for reevaluation if sx worsen or persist

## 2022-11-25 NOTE — TELEPHONE ENCOUNTER
Patient with fever, congestion, and productive cough for the past few days  Patient states she had a negative covid test yesterday  Patient denies respiratory distress  Patient states she is taking tesslon pearls (old prescription) and tylenol for symptoms  Patient scheduled for a virtual appointment today  Home care advice given

## 2022-11-25 NOTE — TELEPHONE ENCOUNTER
Reason for Disposition  • Common cold with no complications    Answer Assessment - Initial Assessment Questions  1  ONSET: "When did the symptoms start?"       11/22  2  AMOUNT: "How much discharge is there?"       Congested  3  COUGH: "Do you have a cough?" If yes, ask: "Describe the color of your sputum" (clear, white, yellow, green)      Productive cough   4  RESPIRATORY DISTRESS: "Describe your breathing "       Denies  5  FEVER: "Do you have a fever?" If Yes, ask: "What is your temperature, how was it measured, and when did it start?"      100 0  7  OTHER SYMPTOMS: "Do you have any other symptoms?" (e g , sore throat, earache, wheezing, vomiting)      Cough, nasal congestion, low fever  8   PREGNANCY: "Is there any chance you are pregnant?" "When was your last menstrual period?"      N/A    Taking left over tesslon pearls, and tylenol for fever  Negative covid test yesterday    Protocols used: COMMON COLD-ADULT-

## 2023-01-06 ENCOUNTER — HOSPITAL ENCOUNTER (OUTPATIENT)
Dept: RADIOLOGY | Age: 63
Discharge: HOME | End: 2023-01-06

## 2023-01-06 VITALS — BODY MASS INDEX: 22.53 KG/M2 | HEIGHT: 64 IN | WEIGHT: 132 LBS

## 2023-01-06 DIAGNOSIS — Z12.31 ENCOUNTER FOR SCREENING MAMMOGRAM FOR MALIGNANT NEOPLASM OF BREAST: ICD-10-CM

## 2023-06-01 ENCOUNTER — VBI (OUTPATIENT)
Dept: ADMINISTRATIVE | Facility: OTHER | Age: 63
End: 2023-06-01

## 2023-08-25 ENCOUNTER — OFFICE VISIT (OUTPATIENT)
Dept: FAMILY MEDICINE CLINIC | Facility: CLINIC | Age: 63
End: 2023-08-25
Payer: COMMERCIAL

## 2023-08-25 VITALS
WEIGHT: 138 LBS | HEIGHT: 64 IN | HEART RATE: 60 BPM | OXYGEN SATURATION: 97 % | SYSTOLIC BLOOD PRESSURE: 110 MMHG | BODY MASS INDEX: 23.56 KG/M2 | DIASTOLIC BLOOD PRESSURE: 72 MMHG

## 2023-08-25 DIAGNOSIS — J06.9 UPPER RESPIRATORY TRACT INFECTION, UNSPECIFIED TYPE: Primary | ICD-10-CM

## 2023-08-25 DIAGNOSIS — H02.89 IRRITATION OF EYELID: ICD-10-CM

## 2023-08-25 PROCEDURE — 99214 OFFICE O/P EST MOD 30 MIN: CPT | Performed by: NURSE PRACTITIONER

## 2023-08-25 RX ORDER — AMOXICILLIN AND CLAVULANATE POTASSIUM 875; 125 MG/1; MG/1
1 TABLET, FILM COATED ORAL EVERY 12 HOURS SCHEDULED
Qty: 14 TABLET | Refills: 0 | Status: SHIPPED | OUTPATIENT
Start: 2023-08-25 | End: 2023-09-01

## 2023-08-25 NOTE — ASSESSMENT & PLAN NOTE
Based on symptoms duration with lack of resolution will start augmentin bid x 7 days. Can continue otc meds prn, suggested flonase.   Pt instructed to call for reevaluation if sx worsen or persist.

## 2023-08-25 NOTE — ASSESSMENT & PLAN NOTE
Mild swelling along R upper orbit, looks like minor localized reaction, no s/sx of infection. Continue cool compress and monitor for change.   Pt instructed to call for reevaluation if sx worsen or persist.

## 2023-08-25 NOTE — PROGRESS NOTES
Name: Anne Marie Carias      : 1960      MRN: [id number]  Encounter Provider: ENMA Schaefer  Encounter Date: 2023   Encounter department: [facility]     1. Upper respiratory tract infection, unspecified type  Assessment & Plan:  Based on symptoms duration with lack of resolution will start augmentin bid x 7 days. Can continue otc meds prn, suggested flonase. Pt instructed to call for reevaluation if sx worsen or persist.      Orders:  -     amoxicillin-clavulanate (AUGMENTIN) 875-125 mg per tablet; Take 1 tablet by mouth every 12 (twelve) hours for 7 days    2. Irritation of eyelid  Assessment & Plan:  Mild swelling along R upper orbit, looks like minor localized reaction, no s/sx of infection. Continue cool compress and monitor for change. Pt instructed to call for reevaluation if sx worsen or persist.             Subjective      Pt is a 65 y.o. y/o female who is seen today for evaluation of URI symptoms. She has had sinus pressure and nasal congestion and headache with post-nasal drip for the last 3-4 weeks. She has tried dayquil, nyquil, saline spray. No fever, chills, body aches. She is taking tylenol. She took a covid test about 10 d ago and was negative. She also complains today of swelling of her R brow. She says she got her brows waxed yesterday and at night the area burned and was tender so she put a cool compress on it. She woke up today and that eyelid is a bit swollen, not as tender, no longer burning. Review of Systems   Constitutional: Negative for appetite change, chills, fatigue and fever. HENT: Positive for congestion, postnasal drip and sinus pressure. Negative for ear pain, sinus pain and sore throat. Eyes: Negative for discharge, redness, itching and visual disturbance. Respiratory: Negative for cough, chest tightness, shortness of breath and wheezing.     Cardiovascular: Negative for chest pain, palpitations and leg swelling. Gastrointestinal: Negative for diarrhea, nausea and vomiting. Genitourinary: Negative for dysuria. Musculoskeletal: Negative for myalgias. Neurological: Positive for headaches. Negative for dizziness and light-headedness. Hematological: Negative for adenopathy. Psychiatric/Behavioral: Negative for sleep disturbance. Current Outpatient Medications on File Prior to Visit   Medication Sig   • Biotin w/ Vitamins C & E (HAIR/SKIN/NAILS PO) Take by mouth   • Calcium Carb-Cholecalciferol 600-800 MG-UNIT TABS Take 1 tablet by mouth daily   • cholecalciferol (VITAMIN D3) 1,000 units tablet Take 1,000 Units by mouth daily   • docusate sodium (COLACE) 100 mg capsule Take 1 capsule (100 mg total) by mouth in the morning and 1 capsule (100 mg total) in the evening. (Patient taking differently: Take 100 mg by mouth 2 (two) times a day PRN)   • estradiol (VIVELLE-DOT) 0.1 MG/24HR PLACE 1 PATCH ON THE SKIN 2 TIMES A WEEK. • Multiple Vitamin (MULTI-VITAMIN DAILY PO) Take 1 tablet by mouth daily   • Omega-3 Fatty Acids (FISH OIL) 1,000 mg Take 1 capsule by mouth daily   • Probiotic Product (ACIDOPHILUS XTRA PO) Take 1 tablet by mouth daily   • [DISCONTINUED] guaifenesin-codeine (GUAIFENESIN AC) 100-10 MG/5ML liquid Take 5 mL by mouth 3 (three) times a day as needed for cough (Patient not taking: Reported on 8/25/2023)       Objective     /72 (BP Location: Left arm, Patient Position: Sitting, Cuff Size: Standard)   Pulse 60   Ht 5' 4" (1.626 m)   Wt 62.6 kg (138 lb)   SpO2 97%   BMI 23.69 kg/m²     Physical Exam  Vitals reviewed. Constitutional:       General: She is awake. She is not in acute distress. Appearance: Normal appearance. She is well-developed and well-groomed. She is not ill-appearing. HENT:      Right Ear: Hearing, ear canal and external ear normal. A middle ear effusion is present. Tympanic membrane is not bulging.       Left Ear: Hearing, tympanic membrane, ear canal and external ear normal.  No middle ear effusion. Tympanic membrane is not bulging. Nose: Mucosal edema and congestion present. No rhinorrhea. Right Turbinates: Swollen. Left Turbinates: Swollen. Mouth/Throat:      Lips: Pink. Mouth: Mucous membranes are moist. Mucous membranes are not dry. Pharynx: No oropharyngeal exudate or posterior oropharyngeal erythema. Tonsils: No tonsillar abscesses. Eyes:      Conjunctiva/sclera: Conjunctivae normal.      Comments: Mild swelling along r upper orbit, no erythema, warmth, tenderness, induration. Cardiovascular:      Rate and Rhythm: Normal rate and regular rhythm. Heart sounds: Normal heart sounds. No murmur heard. Pulmonary:      Effort: Pulmonary effort is normal. No accessory muscle usage or respiratory distress. Breath sounds: Normal breath sounds. No decreased breath sounds, wheezing, rhonchi or rales. Lymphadenopathy:      Head:      Right side of head: No submental, submandibular, tonsillar, preauricular, posterior auricular or occipital adenopathy. Left side of head: No submental, submandibular, tonsillar, preauricular, posterior auricular or occipital adenopathy. Cervical: No cervical adenopathy. Skin:     General: Skin is warm and dry. Neurological:      Mental Status: She is alert and oriented to person, place, and time. Psychiatric:         Attention and Perception: Attention normal.         Mood and Affect: Mood normal.         Speech: Speech normal.         Behavior: Behavior normal. Behavior is cooperative. Thought Content:  Thought content normal.         Cognition and Memory: Cognition normal.         Judgment: Judgment normal.       ENMA Grajeda

## 2024-01-11 ENCOUNTER — HOSPITAL ENCOUNTER (OUTPATIENT)
Dept: RADIOLOGY | Age: 64
Discharge: HOME | End: 2024-01-11
Payer: COMMERCIAL

## 2024-01-11 DIAGNOSIS — Z12.31 VISIT FOR SCREENING MAMMOGRAM: ICD-10-CM

## 2024-01-11 PROCEDURE — 77067 SCR MAMMO BI INCL CAD: CPT

## 2024-01-11 PROCEDURE — 77063 BREAST TOMOSYNTHESIS BI: CPT

## 2024-01-16 DIAGNOSIS — Z79.890 HORMONE REPLACEMENT THERAPY (HRT): ICD-10-CM

## 2024-01-16 RX ORDER — ESTRADIOL 0.1 MG/D
FILM, EXTENDED RELEASE TRANSDERMAL
Qty: 24 PATCH | Refills: 5 | Status: SHIPPED | OUTPATIENT
Start: 2024-01-16

## 2024-01-18 ENCOUNTER — OFFICE VISIT (OUTPATIENT)
Dept: FAMILY MEDICINE CLINIC | Facility: CLINIC | Age: 64
End: 2024-01-18
Payer: COMMERCIAL

## 2024-01-18 VITALS
TEMPERATURE: 97 F | RESPIRATION RATE: 14 BRPM | HEIGHT: 65 IN | WEIGHT: 134.8 LBS | OXYGEN SATURATION: 100 % | SYSTOLIC BLOOD PRESSURE: 126 MMHG | BODY MASS INDEX: 22.46 KG/M2 | HEART RATE: 52 BPM | DIASTOLIC BLOOD PRESSURE: 84 MMHG

## 2024-01-18 DIAGNOSIS — Z13.1 SCREENING FOR DIABETES MELLITUS: ICD-10-CM

## 2024-01-18 DIAGNOSIS — Z12.2 SCREENING FOR LUNG CANCER: ICD-10-CM

## 2024-01-18 DIAGNOSIS — Z87.891 FORMER SMOKER: ICD-10-CM

## 2024-01-18 DIAGNOSIS — Z12.31 ENCOUNTER FOR SCREENING MAMMOGRAM FOR MALIGNANT NEOPLASM OF BREAST: ICD-10-CM

## 2024-01-18 DIAGNOSIS — Z13.220 SCREENING, LIPID: ICD-10-CM

## 2024-01-18 DIAGNOSIS — Z00.00 ANNUAL PHYSICAL EXAM: Primary | ICD-10-CM

## 2024-01-18 PROBLEM — K40.90 INGUINAL HERNIA: Status: RESOLVED | Noted: 2022-09-27 | Resolved: 2024-01-18

## 2024-01-18 PROBLEM — R10.31 ABDOMINAL WALL PAIN IN RIGHT LOWER QUADRANT: Status: RESOLVED | Noted: 2022-03-21 | Resolved: 2024-01-18

## 2024-01-18 PROBLEM — H02.89 IRRITATION OF EYELID: Status: RESOLVED | Noted: 2023-08-25 | Resolved: 2024-01-18

## 2024-01-18 PROBLEM — J06.9 UPPER RESPIRATORY TRACT INFECTION: Status: RESOLVED | Noted: 2022-11-25 | Resolved: 2024-01-18

## 2024-01-18 PROBLEM — R10.31 RIGHT GROIN PAIN: Status: RESOLVED | Noted: 2022-08-09 | Resolved: 2024-01-18

## 2024-01-18 PROBLEM — N94.89: Status: RESOLVED | Noted: 2022-09-27 | Resolved: 2024-01-18

## 2024-01-18 PROCEDURE — 99396 PREV VISIT EST AGE 40-64: CPT | Performed by: FAMILY MEDICINE

## 2024-01-18 NOTE — PROGRESS NOTES
ADULT ANNUAL PHYSICAL  [facility]    NAME: Elma Davenport  AGE: 63 y.o. SEX: female  : 1960     DATE: 2024     Assessment and Plan:     Problem List Items Addressed This Visit     Screening for breast cancer    Relevant Orders    Mammo screening bilateral w 3d & cad    Screening, lipid - Primary    Relevant Orders    Lipid Panel with Direct LDL reflex    Former smoker    Relevant Orders    CT lung screening program       Immunizations and preventive care screenings were discussed with patient today. Appropriate education was printed on patient's after visit summary.    Counseling:  Dental Health: discussed importance of regular tooth brushing, flossing, and dental visits.      Depression Screening and Follow-up Plan: Patient was screened for depression during today's encounter. They screened negative with a PHQ-2 score of 0.    Lung Cancer Screening Shared Decision Making: I discussed with her that she is a candidate for lung cancer CT screening.     The following Shared Decision-Making points were covered:  Benefits of screening were discussed, including the rates of reduction in death from lung cancer and other causes.  Harms of screening were reviewed, including false positive tests, radiation exposure levels, risks of invasive procedures, risks of complications of screening, and risk of overdiagnosis.  I counseled on the importance of adherence to annual lung cancer LDCT screening, impact of co-morbidities, and ability or willingness to undergo diagnosis and treatment.  I counseled on the importance of maintaining abstinence as a former smoker or was counseled on the importance of smoking cessation if a current smoker    Review of Eligibility Criteria: She meets all of the criteria for Lung Cancer Screening.   - She is 63 y.o.   - She has 20 pack year tobacco history and is a current smoker or has quit within the past 15 years  - She  presents no signs or symptoms of lung cancer    After discussion, the patient decided to elect lung cancer screening.        Return in 1 year (on 1/18/2025).     Chief Complaint:     Chief Complaint   Patient presents with   • Physical Exam     Patient being seen for physical       History of Present Illness:     Adult Annual Physical   Patient here for a comprehensive physical exam. The patient reports no problems.    Diet and Physical Activity  Diet/Nutrition: well balanced diet.   Exercise: walking.      Depression Screening  PHQ-2/9 Depression Screening    Little interest or pleasure in doing things: 0 - not at all  Feeling down, depressed, or hopeless: 0 - not at all  PHQ-2 Score: 0  PHQ-2 Interpretation: Negative depression screen       General Health  Sleep: sleeps well.   Hearing: normal - bilateral.  Vision: no vision problems.   Dental: regular dental visits.       /GYN Health  Follows with gynecology? no   Patient is: postmenopausal  Last menstrual period: n/a  Contraceptive method: menopause.    Advanced Care Planning  Do you have an advanced directive? yes  Do you have a durable medical power of ? yes     Review of Systems:     Review of Systems   Constitutional: Negative.    HENT: Negative.     Eyes: Negative.    Respiratory: Negative.     Cardiovascular: Negative.    Gastrointestinal: Negative.    Endocrine: Negative.    Genitourinary: Negative.    Musculoskeletal: Negative.    Allergic/Immunologic: Negative.    Neurological: Negative.    Hematological: Negative.    Psychiatric/Behavioral: Negative.        Past Medical History:     Past Medical History:   Diagnosis Date   • Abdominal wall pain in right lower quadrant    • Acquired cyst of canal of Nuck    • Conjunctival hemorrhage, right 09/17/2019   • COVID 04/2022   • COVID-19    • Ecchymoses, spontaneous     Last assessed 12/05/16   • Edema     Last assessed 02/26/13   • H/O total hysterectomy    • Impetigo     last assessed 05/24/16   •  Inguinal hernia    • Irritation of eyelid    • Left ear pain 2018      Past Surgical History:     Past Surgical History:   Procedure Laterality Date   •  SECTION     • OOPHORECTOMY Bilateral     age 43   • CA RPR 1ST INGUN HRNA AGE 5 YRS/> REDUCIBLE Right 2022    Procedure: REPAIR HERNIA INGUINALWITH MESH;  Surgeon: Raji Lopez MD;  Location: [facility];  Service: General   • TOTAL ABDOMINAL HYSTERECTOMY      age 43      Social History:     Social History     Socioeconomic History   • Marital status: /Civil Union     Spouse name: None   • Number of children: None   • Years of education: None   • Highest education level: None   Occupational History   • None   Tobacco Use   • Smoking status: Former     Current packs/day: 0.00     Average packs/day: 1 pack/day for 20.0 years (20.0 ttl pk-yrs)     Types: Cigarettes     Start date: 1980     Quit date: 2000     Years since quittin.0   • Smokeless tobacco: Never   Vaping Use   • Vaping status: Never Used   Substance and Sexual Activity   • Alcohol use: Yes     Alcohol/week: 2.0 standard drinks of alcohol     Types: 2 Glasses of wine per week     Comment: social-once per week   • Drug use: No   • Sexual activity: Yes     Partners: Male   Other Topics Concern   • None   Social History Narrative   • None     Social Determinants of Health     Financial Resource Strain: Not on file   Food Insecurity: Not on file   Transportation Needs: Not on file   Physical Activity: Not on file   Stress: Not on file   Social Connections: Not on file   Intimate Partner Violence: Not on file   Housing Stability: Not on file      Family History:     Family History   Problem Relation Age of Onset   • Hyperlipidemia Mother    • Hypertension Mother    • Heart attack Mother    • Aneurysm Mother    • COPD Mother    • Coronary artery disease Mother    • Diabetes Mother    • Hyperlipidemia Father    • Hypertension Father    • COPD Father    • Heart attack  "Father    • Emphysema Father    • Breast cancer Sister 58   • Cancer Sister    • COPD Sister    • Emphysema Sister    • Heart attack Sister    • Hypertension Sister 61   • Lung cancer Sister    • Stroke Maternal Grandfather    • Diabetes Paternal Grandmother    • Stroke Paternal Grandfather    • No Known Problems Brother    • No Known Problems Son    • No Known Problems Son    • Breast cancer Paternal Aunt         unknown age   • No Known Problems Paternal Aunt    • No Known Problems Paternal Aunt    • No Known Problems Paternal Aunt    • Lung cancer Paternal Uncle         unknown age   • Crohn's disease Family    • Breast cancer Family    • Lung cancer Family       Current Medications:     Current Outpatient Medications   Medication Sig Dispense Refill   • Biotin w/ Vitamins C & E (HAIR/SKIN/NAILS PO) Take by mouth     • Calcium Carb-Cholecalciferol 600-800 MG-UNIT TABS Take 1 tablet by mouth daily     • cholecalciferol (VITAMIN D3) 1,000 units tablet Take 1,000 Units by mouth daily     • estradiol (VIVELLE-DOT) 0.1 MG/24HR PLACE 1 PATCH ON THE SKIN 2 TIMES A WEEK. 24 patch 5   • Multiple Vitamin (MULTI-VITAMIN DAILY PO) Take 1 tablet by mouth daily     • Omega-3 Fatty Acids (FISH OIL) 1,000 mg Take 1 capsule by mouth daily     • Probiotic Product (ACIDOPHILUS XTRA PO) Take 1 tablet by mouth daily       No current facility-administered medications for this visit.      Allergies:     No Known Allergies   Physical Exam:     /84 (BP Location: Left arm, Patient Position: Sitting, Cuff Size: Standard)   Pulse (!) 52   Temp (!) 97 °F (36.1 °C) (Tympanic)   Resp 14   Ht 5' 4.65\" (1.642 m)   Wt 61.1 kg (134 lb 12.8 oz)   SpO2 100%   BMI 22.68 kg/m²     Physical Exam  Vitals and nursing note reviewed.   Constitutional:       Appearance: Normal appearance. She is well-developed.   HENT:      Head: Normocephalic and atraumatic.      Right Ear: External ear normal.      Left Ear: External ear normal.      Nose: " Nose normal.   Eyes:      Extraocular Movements: Extraocular movements intact.      Conjunctiva/sclera: Conjunctivae normal.      Pupils: Pupils are equal, round, and reactive to light.   Cardiovascular:      Rate and Rhythm: Normal rate and regular rhythm.      Heart sounds: Normal heart sounds.   Pulmonary:      Effort: Pulmonary effort is normal.      Breath sounds: Normal breath sounds.   Abdominal:      General: Abdomen is flat. Bowel sounds are normal.      Palpations: Abdomen is soft.   Musculoskeletal:         General: Normal range of motion.      Cervical back: Normal range of motion and neck supple.   Skin:     General: Skin is warm and dry.      Capillary Refill: Capillary refill takes less than 2 seconds.   Neurological:      General: No focal deficit present.      Mental Status: She is alert and oriented to person, place, and time.   Psychiatric:         Mood and Affect: Mood normal.         Behavior: Behavior normal.         Thought Content: Thought content normal.         Judgment: Judgment normal.          DO KIRAN Vaz [facility]

## 2024-02-08 ENCOUNTER — APPOINTMENT (OUTPATIENT)
Dept: LAB | Facility: AMBULARY SURGERY CENTER | Age: 64
End: 2024-02-08
Payer: COMMERCIAL

## 2024-02-08 DIAGNOSIS — Z13.220 SCREENING, LIPID: ICD-10-CM

## 2024-02-08 DIAGNOSIS — Z13.1 SCREENING FOR DIABETES MELLITUS: ICD-10-CM

## 2024-02-08 LAB
ALBUMIN SERPL BCP-MCNC: 3.9 G/DL (ref 3.5–5)
ALP SERPL-CCNC: 42 U/L (ref 34–104)
ALT SERPL W P-5'-P-CCNC: 11 U/L (ref 7–52)
ANION GAP SERPL CALCULATED.3IONS-SCNC: 6 MMOL/L
AST SERPL W P-5'-P-CCNC: 17 U/L (ref 13–39)
BILIRUB SERPL-MCNC: 0.58 MG/DL (ref 0.2–1)
BUN SERPL-MCNC: 12 MG/DL (ref 5–25)
CALCIUM SERPL-MCNC: 9 MG/DL (ref 8.4–10.2)
CHLORIDE SERPL-SCNC: 103 MMOL/L (ref 96–108)
CHOLEST SERPL-MCNC: 181 MG/DL
CO2 SERPL-SCNC: 31 MMOL/L (ref 21–32)
CREAT SERPL-MCNC: 0.63 MG/DL (ref 0.6–1.3)
GFR SERPL CREATININE-BSD FRML MDRD: 96 ML/MIN/1.73SQ M
GLUCOSE P FAST SERPL-MCNC: 74 MG/DL (ref 65–99)
HDLC SERPL-MCNC: 78 MG/DL
LDLC SERPL CALC-MCNC: 90 MG/DL (ref 0–100)
POTASSIUM SERPL-SCNC: 4.2 MMOL/L (ref 3.5–5.3)
PROT SERPL-MCNC: 6.1 G/DL (ref 6.4–8.4)
SODIUM SERPL-SCNC: 140 MMOL/L (ref 135–147)
TRIGL SERPL-MCNC: 64 MG/DL

## 2024-02-08 PROCEDURE — 36415 COLL VENOUS BLD VENIPUNCTURE: CPT

## 2024-02-08 PROCEDURE — 80061 LIPID PANEL: CPT

## 2024-02-08 PROCEDURE — 80053 COMPREHEN METABOLIC PANEL: CPT

## 2024-02-21 PROBLEM — Z00.00 WELL ADULT EXAM: Status: RESOLVED | Noted: 2018-02-20 | Resolved: 2024-02-21

## 2024-02-21 PROBLEM — Z12.39 SCREENING FOR BREAST CANCER: Status: RESOLVED | Noted: 2020-07-16 | Resolved: 2024-02-21

## 2024-02-21 PROBLEM — Z13.220 SCREENING, LIPID: Status: RESOLVED | Noted: 2021-04-20 | Resolved: 2024-02-21

## 2024-03-20 ENCOUNTER — OFFICE VISIT (OUTPATIENT)
Dept: FAMILY MEDICINE CLINIC | Facility: CLINIC | Age: 64
End: 2024-03-20
Payer: COMMERCIAL

## 2024-03-20 VITALS
DIASTOLIC BLOOD PRESSURE: 80 MMHG | BODY MASS INDEX: 23.35 KG/M2 | HEIGHT: 64 IN | WEIGHT: 136.8 LBS | RESPIRATION RATE: 14 BRPM | SYSTOLIC BLOOD PRESSURE: 124 MMHG | TEMPERATURE: 96.8 F

## 2024-03-20 DIAGNOSIS — L25.5 RHUS DERMATITIS: Primary | ICD-10-CM

## 2024-03-20 PROCEDURE — 99213 OFFICE O/P EST LOW 20 MIN: CPT | Performed by: FAMILY MEDICINE

## 2024-03-20 RX ORDER — PREDNISONE 10 MG/1
TABLET ORAL
Qty: 30 TABLET | Refills: 0 | Status: SHIPPED | OUTPATIENT
Start: 2024-03-20

## 2024-03-20 NOTE — PROGRESS NOTES
Assessment/Plan:    1. Rhus dermatitis  Assessment & Plan:  Start prednisone  Soap to strip oil    Orders:  -     predniSONE 10 mg tablet; Take 4 daily for 4 days, then 3 daily for 3 days, then 2 daily for 2 days, then 1 daily for 1 days. Take with food.           There are no Patient Instructions on file for this visit.    No follow-ups on file.    Subjective:      Patient ID: Elma Davenport is a 63 y.o. female.    Chief Complaint   Patient presents with   • Poison Ivy     Patient being seen for poison on bilateral arms x 3 days        Did yard work this past weekend  Started with poison ivy on one arm and now into the other arm    Poison Ivy  This is a new problem. The current episode started in the past 7 days. The affected locations include the left arm and right arm. The rash is characterized by redness and itchiness. She was exposed to plant contact. Past treatments include nothing. The treatment provided no relief.       The following portions of the patient's history were reviewed and updated as appropriate:  past social history    Review of Systems      Current Outpatient Medications   Medication Sig Dispense Refill   • Biotin w/ Vitamins C & E (HAIR/SKIN/NAILS PO) Take by mouth     • Calcium Carb-Cholecalciferol 600-800 MG-UNIT TABS Take 1 tablet by mouth daily     • cholecalciferol (VITAMIN D3) 1,000 units tablet Take 1,000 Units by mouth daily     • estradiol (VIVELLE-DOT) 0.1 MG/24HR PLACE 1 PATCH ON THE SKIN 2 TIMES A WEEK. 24 patch 5   • Multiple Vitamin (MULTI-VITAMIN DAILY PO) Take 1 tablet by mouth daily     • Omega-3 Fatty Acids (FISH OIL) 1,000 mg Take 1 capsule by mouth daily     • predniSONE 10 mg tablet Take 4 daily for 4 days, then 3 daily for 3 days, then 2 daily for 2 days, then 1 daily for 1 days. Take with food. 30 tablet 0   • Probiotic Product (ACIDOPHILUS XTRA PO) Take 1 tablet by mouth daily       No current facility-administered medications for this visit.       Objective:    BP  "124/80 (BP Location: Left arm, Patient Position: Sitting, Cuff Size: Standard)   Temp (!) 96.8 °F (36 °C) (Tympanic)   Resp 14   Ht 5' 4\" (1.626 m)   Wt 62.1 kg (136 lb 12.8 oz)   BMI 23.48 kg/m²      Physical Exam  Vitals and nursing note reviewed.   Constitutional:       Appearance: Normal appearance.   Eyes:      Extraocular Movements: Extraocular movements intact.      Pupils: Pupils are equal, round, and reactive to light.   Skin:     Findings: Rash present.   Neurological:      General: No focal deficit present.      Mental Status: She is alert and oriented to person, place, and time.   Psychiatric:         Mood and Affect: Mood normal.         Behavior: Behavior normal.         Thought Content: Thought content normal.         Judgment: Judgment normal.             Janie Cooney DO  "

## 2024-04-12 ENCOUNTER — VBI (OUTPATIENT)
Dept: ADMINISTRATIVE | Facility: OTHER | Age: 64
End: 2024-04-12

## 2024-08-15 ENCOUNTER — VBI (OUTPATIENT)
Dept: ADMINISTRATIVE | Facility: OTHER | Age: 64
End: 2024-08-15

## 2024-08-15 NOTE — TELEPHONE ENCOUNTER
08/15/24 7:03 AM     Chart reviewed for Pap Smear (HPV) aka Cervical Cancer Screening ; nothing is submitted to the patient's insurance at this time.     ROLY DIXON MA   [facility]

## 2024-10-15 ENCOUNTER — OFFICE VISIT (OUTPATIENT)
Dept: FAMILY MEDICINE CLINIC | Facility: CLINIC | Age: 64
End: 2024-10-15
Payer: COMMERCIAL

## 2024-10-15 VITALS
WEIGHT: 136 LBS | RESPIRATION RATE: 16 BRPM | HEIGHT: 64 IN | BODY MASS INDEX: 23.22 KG/M2 | DIASTOLIC BLOOD PRESSURE: 70 MMHG | TEMPERATURE: 97.3 F | OXYGEN SATURATION: 99 % | SYSTOLIC BLOOD PRESSURE: 126 MMHG | HEART RATE: 55 BPM

## 2024-10-15 DIAGNOSIS — M10.9 ACUTE GOUT OF RIGHT HAND, UNSPECIFIED CAUSE: ICD-10-CM

## 2024-10-15 DIAGNOSIS — M25.441 FINGER JOINT SWELLING, RIGHT: Primary | ICD-10-CM

## 2024-10-15 PROCEDURE — 99214 OFFICE O/P EST MOD 30 MIN: CPT | Performed by: FAMILY MEDICINE

## 2024-10-15 RX ORDER — INDOMETHACIN 75 MG/1
75 CAPSULE, EXTENDED RELEASE ORAL 2 TIMES DAILY WITH MEALS
Qty: 14 CAPSULE | Refills: 0 | Status: SHIPPED | OUTPATIENT
Start: 2024-10-15 | End: 2024-10-18

## 2024-10-15 NOTE — PROGRESS NOTES
Ambulatory Visit  Name: Elma Davenport      : 1960      MRN: [id number]  Encounter Provider: Saadia Dyson MD  Encounter Date: 10/15/2024   Encounter department: [facility]    63-year-old female seen with swelling in the right thumb along the DIP joint.  No known injuries, fevers, or history of gout.  Bony tenderness and limited range of motion appreciated on exam.  Suspect gout.  Will treat as such.  Start indomethacin 25 mg twice daily with food x 5 to 7 days.  Asked to call if symptoms do not improve.  Assessment & Plan  Finger joint swelling, right    Orders:    indomethacin (INDOCIN SR) 75 mg CR capsule; Take 1 capsule (75 mg total) by mouth 2 (two) times a day with meals    Acute gout of right hand, unspecified cause    Orders:    indomethacin (INDOCIN SR) 75 mg CR capsule; Take 1 capsule (75 mg total) by mouth 2 (two) times a day with meals                 History of Present Illness     Presents with swelling of the right thumb.  Started on   No injuries or insect bites   Painful to touch with limited ROM  Has pain and swelling of the left thumb years ago but not in the right hand   Denies kidney disease, heavy ETOH consumption, or gout.  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     Medical History Reviewed by provider this encounter:       Past Medical History   Past Medical History:   Diagnosis Date    Abdominal wall pain in right lower quadrant     Acquired cyst of canal of Nuck     Conjunctival hemorrhage, right 2019    COVID 2022    COVID-19     Ecchymoses, spontaneous     Last assessed 16    Edema     Last assessed 13    H/O total hysterectomy     Impetigo     last assessed 16    Inguinal hernia     Irritation of eyelid     Left ear pain 2018     Past Surgical History:   Procedure Laterality  Date     SECTION      OOPHORECTOMY Bilateral     age 43    CO RPR 1ST INGUN HRNA AGE 5 YRS/> REDUCIBLE Right 2022    Procedure: REPAIR HERNIA INGUINALWITH MESH;  Surgeon: Raji Lopez MD;  Location: [facility];  Service: General    TOTAL ABDOMINAL HYSTERECTOMY      age 43     Family History   Problem Relation Age of Onset    Hyperlipidemia Mother     Hypertension Mother     Heart attack Mother     Aneurysm Mother     COPD Mother     Coronary artery disease Mother     Diabetes Mother     Hyperlipidemia Father     Hypertension Father     COPD Father     Heart attack Father     Emphysema Father     Breast cancer Sister 58    Cancer Sister     COPD Sister     Emphysema Sister     Heart attack Sister     Hypertension Sister 61    Lung cancer Sister     Stroke Maternal Grandfather     Diabetes Paternal Grandmother     Stroke Paternal Grandfather     No Known Problems Brother     No Known Problems Son     No Known Problems Son     Breast cancer Paternal Aunt         unknown age    No Known Problems Paternal Aunt     No Known Problems Paternal Aunt     No Known Problems Paternal Aunt     Lung cancer Paternal Uncle         unknown age    Crohn's disease Family     Breast cancer Family     Lung cancer Family      Current Outpatient Medications on File Prior to Visit   Medication Sig Dispense Refill    Biotin w/ Vitamins C & E (HAIR/SKIN/NAILS PO) Take by mouth      Calcium Carb-Cholecalciferol 600-800 MG-UNIT TABS Take 1 tablet by mouth daily      cholecalciferol (VITAMIN D3) 1,000 units tablet Take 1,000 Units by mouth daily      estradiol (VIVELLE-DOT) 0.1 MG/24HR PLACE 1 PATCH ON THE SKIN 2 TIMES A WEEK. 24 patch 5    Multiple Vitamin (MULTI-VITAMIN DAILY PO) Take 1 tablet by mouth daily      Omega-3 Fatty Acids (FISH OIL) 1,000 mg Take 1 capsule by mouth daily      Probiotic Product (ACIDOPHILUS XTRA PO) Take 1 tablet by mouth daily      predniSONE 10 mg tablet Take 4 daily for 4 days, then 3 daily for  "3 days, then 2 daily for 2 days, then 1 daily for 1 days. Take with food. 30 tablet 0     No current facility-administered medications on file prior to visit.   No Known Allergies   Current Outpatient Medications on File Prior to Visit   Medication Sig Dispense Refill    Biotin w/ Vitamins C & E (HAIR/SKIN/NAILS PO) Take by mouth      Calcium Carb-Cholecalciferol 600-800 MG-UNIT TABS Take 1 tablet by mouth daily      cholecalciferol (VITAMIN D3) 1,000 units tablet Take 1,000 Units by mouth daily      estradiol (VIVELLE-DOT) 0.1 MG/24HR PLACE 1 PATCH ON THE SKIN 2 TIMES A WEEK. 24 patch 5    Multiple Vitamin (MULTI-VITAMIN DAILY PO) Take 1 tablet by mouth daily      Omega-3 Fatty Acids (FISH OIL) 1,000 mg Take 1 capsule by mouth daily      Probiotic Product (ACIDOPHILUS XTRA PO) Take 1 tablet by mouth daily      predniSONE 10 mg tablet Take 4 daily for 4 days, then 3 daily for 3 days, then 2 daily for 2 days, then 1 daily for 1 days. Take with food. 30 tablet 0     No current facility-administered medications on file prior to visit.      Social History     Tobacco Use    Smoking status: Former     Current packs/day: 0.00     Average packs/day: 1 pack/day for 20.0 years (20.0 ttl pk-yrs)     Types: Cigarettes     Start date: 1980     Quit date: 2000     Years since quittin.8    Smokeless tobacco: Never   Vaping Use    Vaping status: Never Used   Substance and Sexual Activity    Alcohol use: Yes     Alcohol/week: 2.0 standard drinks of alcohol     Types: 2 Glasses of wine per week     Comment: social-once per week    Drug use: No    Sexual activity: Yes     Partners: Male         Objective     /70 (BP Location: Left arm, Patient Position: Sitting, Cuff Size: Adult)   Pulse 55   Temp (!) 97.3 °F (36.3 °C) (Tympanic)   Resp 16   Ht 5' 4\" (1.626 m)   Wt 61.7 kg (136 lb)   SpO2 99%   BMI 23.34 kg/m²     Physical Exam  Constitutional:       Appearance: Normal appearance. She is not ill-appearing. "   HENT:      Head: Normocephalic and atraumatic.   Eyes:      Extraocular Movements: Extraocular movements intact.   Pulmonary:      Effort: Pulmonary effort is normal.   Musculoskeletal:      Right hand: Swelling (DIP of the right thumb), deformity, tenderness and bony tenderness present. Decreased range of motion.   Skin:     General: Skin is warm.      Findings: Erythema present.   Neurological:      Mental Status: She is alert and oriented to person, place, and time.   Psychiatric:         Mood and Affect: Mood normal.         Behavior: Behavior normal.         Thought Content: Thought content normal.

## 2024-10-18 ENCOUNTER — NURSE TRIAGE (OUTPATIENT)
Dept: OTHER | Facility: OTHER | Age: 64
End: 2024-10-18

## 2024-10-18 NOTE — TELEPHONE ENCOUNTER
"Reason for Disposition  • MODERATE pain (e.g., interferes with normal activities that comes and goes (cramps) lasts > 24 hours (Exception: pain with Vomiting or Diarrhea - see that Protocol)    Answer Assessment - Initial Assessment Questions  1. LOCATION: \"Where does it hurt?\"       Lower abdomen  2. RADIATION: \"Does the pain shoot anywhere else?\" (e.g., chest, back)      No   3. ONSET: \"When did the pain begin?\" (e.g., minutes, hours or days ago)       2 days ago.   6. SEVERITY: \"How bad is the pain?\"  (e.g., Scale 1-10; mild, moderate, or severe)    - MILD (1-3): doesn't interfere with normal activities, abdomen soft and not tender to touch     - MODERATE (4-7): interferes with normal activities or awakens from sleep, tender to touch     - SEVERE (8-10): excruciating pain, doubled over, unable to do any normal activities       7 out of 10 cramps   8. CAUSE: \"What do you think is causing the stomach pain?\"      Patient developed abdominal cramps after she had started Indomethacin   10. OTHER SYMPTOMS: \"Has there been any vomiting, diarrhea, constipation, or urine problems?\"        No   11. PREGNANCY: \"Is there any chance you are pregnant?\" \"When was your last menstrual period?\"        N/A    Protocols used: Abdominal Pain - Female-ADULT-[place]    "

## 2024-10-25 ENCOUNTER — OFFICE VISIT (OUTPATIENT)
Dept: FAMILY MEDICINE CLINIC | Facility: CLINIC | Age: 64
End: 2024-10-25
Payer: COMMERCIAL

## 2024-10-25 VITALS
BODY MASS INDEX: 22.91 KG/M2 | TEMPERATURE: 96.1 F | OXYGEN SATURATION: 95 % | SYSTOLIC BLOOD PRESSURE: 126 MMHG | WEIGHT: 134.2 LBS | HEIGHT: 64 IN | DIASTOLIC BLOOD PRESSURE: 68 MMHG | RESPIRATION RATE: 16 BRPM | HEART RATE: 63 BPM

## 2024-10-25 DIAGNOSIS — R21 RASH: Primary | ICD-10-CM

## 2024-10-25 PROCEDURE — 99213 OFFICE O/P EST LOW 20 MIN: CPT | Performed by: NURSE PRACTITIONER

## 2024-10-25 NOTE — PROGRESS NOTES
"Ambulatory Visit  Name: Elma Davenport      : 1960      MRN: [id number]  Encounter Provider: ENMA Orta  Encounter Date: 10/25/2024   Encounter [facility]: [facility]    Assessment & Plan  Rash  Rash started when she changed from a new deodorant back to her old one, local inflammatory reaction seems most likely.  She should go back to the other deodorant and treat rash with hydrocortisone.  Pt instructed to call for reevaluation if sx worsen or persist.                  History of Present Illness     Pt is a 63 y.o. female who is seen today for evaluation of a rash.  She states that she changed deodorant about a month ago, she is using Cass now and it is working but she thinks it blocks her sweat glands too much.  Last Saturday she decided to change back to her old deodorant and it burned when she applied it and now it is red around her armpits.  Mild itching on the left side.  She changed her soap to dove sensitive soap on Wednesday.          Review of Systems   Constitutional:  Negative for chills and fever.   Musculoskeletal:  Negative for arthralgias and joint swelling.   Skin:  Positive for rash.           Objective     /68 (BP Location: Left arm, Patient Position: Sitting, Cuff Size: Standard)   Pulse 63   Temp (!) 96.1 °F (35.6 °C) (Tympanic)   Resp 16   Ht 5' 4\" (1.626 m)   Wt 60.9 kg (134 lb 3.2 oz)   SpO2 95%   BMI 23.04 kg/m²     Physical Exam  Vitals reviewed.   Constitutional:       General: She is awake. She is not in acute distress.     Appearance: Normal appearance. She is well-developed and well-groomed. She is not ill-appearing.   Eyes:      General: Lids are normal.      Conjunctiva/sclera: Conjunctivae normal.   Pulmonary:      Effort: Pulmonary effort is normal. No tachypnea, accessory muscle usage or respiratory distress.   Skin:     Findings: Rash (see photos) present.   Neurological:      Mental Status: She is alert and oriented to " person, place, and time.   Psychiatric:         Attention and Perception: Attention normal.         Mood and Affect: Mood normal.         Speech: Speech normal.         Behavior: Behavior normal. Behavior is cooperative.         Thought Content: Thought content normal.         Cognition and Memory: Cognition normal.         Judgment: Judgment normal.

## 2024-12-08 ENCOUNTER — VBI (OUTPATIENT)
Dept: ADMINISTRATIVE | Facility: OTHER | Age: 64
End: 2024-12-08

## 2024-12-08 NOTE — TELEPHONE ENCOUNTER
12/08/24 10:38 AM     Chart reviewed for   Cervical Cancer Screening    ; nothing is submitted to the patient's insurance at this time.     ROLY DIXON MA   [facility]

## 2024-12-24 ENCOUNTER — OFFICE VISIT (OUTPATIENT)
Dept: FAMILY MEDICINE CLINIC | Facility: CLINIC | Age: 64
End: 2024-12-24
Payer: COMMERCIAL

## 2024-12-24 VITALS
TEMPERATURE: 97 F | RESPIRATION RATE: 17 BRPM | WEIGHT: 136 LBS | HEIGHT: 64 IN | SYSTOLIC BLOOD PRESSURE: 120 MMHG | DIASTOLIC BLOOD PRESSURE: 70 MMHG | BODY MASS INDEX: 23.22 KG/M2 | HEART RATE: 69 BPM | OXYGEN SATURATION: 99 %

## 2024-12-24 DIAGNOSIS — J01.00 ACUTE NON-RECURRENT MAXILLARY SINUSITIS: ICD-10-CM

## 2024-12-24 DIAGNOSIS — B34.9 VIRAL INFECTION, UNSPECIFIED: Primary | ICD-10-CM

## 2024-12-24 LAB
SARS-COV-2 AG UPPER RESP QL IA: NEGATIVE
VALID CONTROL: NORMAL

## 2024-12-24 PROCEDURE — 99213 OFFICE O/P EST LOW 20 MIN: CPT | Performed by: FAMILY MEDICINE

## 2024-12-24 PROCEDURE — 87811 SARS-COV-2 COVID19 W/OPTIC: CPT | Performed by: FAMILY MEDICINE

## 2024-12-24 NOTE — ASSESSMENT & PLAN NOTE
Orders:  •  amoxicillin-clavulanate (AUGMENTIN) 875-125 mg per tablet; Take 1 tablet by mouth every 12 (twelve) hours for 10 days    Recent Results (from the past 24 hours)   Poct Covid 19 Rapid Antigen Test    Collection Time: 12/24/24  8:53 AM   Result Value Ref Range    POCT SARS-CoV-2 Ag Negative Negative    VALID CONTROL Valid

## 2024-12-24 NOTE — PROGRESS NOTES
"Name: Emla Davenport      : 1960      MRN: [id number]  Encounter Provider: Janie Cooney DO  Encounter Date: 2024   Encounter department: [facility]    Assessment & Plan  Viral infection, unspecified    Orders:  •  Poct Covid 19 Rapid Antigen Test    Acute non-recurrent maxillary sinusitis    Orders:  •  amoxicillin-clavulanate (AUGMENTIN) 875-125 mg per tablet; Take 1 tablet by mouth every 12 (twelve) hours for 10 days    Recent Results (from the past 24 hours)   Poct Covid 19 Rapid Antigen Test    Collection Time: 24  8:53 AM   Result Value Ref Range    POCT SARS-CoV-2 Ag Negative Negative    VALID CONTROL Valid            History of Present Illness     History of Present Illness  Started  night with scratchy throat and headache and headache  No ear pain  Frontal headache  Pnd  Cough with laying down     Review of Systems   Constitutional:  Negative for chills and fever.   HENT:  Positive for postnasal drip, rhinorrhea, sinus pressure, sinus pain and sore throat. Negative for congestion and ear pain.    Eyes: Negative.    Respiratory:  Positive for cough.    Cardiovascular: Negative.    Gastrointestinal: Negative.    Endocrine: Negative.    Genitourinary: Negative.    Musculoskeletal: Negative.  Negative for myalgias.   Neurological:  Positive for headaches.   Hematological: Negative.    Psychiatric/Behavioral: Negative.       Objective   /70 (BP Location: Left arm, Patient Position: Sitting, Cuff Size: Standard)   Pulse 69   Temp (!) 97 °F (36.1 °C) (Tympanic)   Resp 17   Ht 5' 4\" (1.626 m)   Wt 61.7 kg (136 lb)   SpO2 99%   BMI 23.34 kg/m²     Physical Exam    Physical Exam  Vitals and nursing note reviewed.   Constitutional:       Appearance: Normal appearance. She is well-developed.   HENT:      Head: Normocephalic and atraumatic.      Right Ear: External ear normal.      Left Ear: External ear normal.      Nose: Nose normal.   Eyes:      " Conjunctiva/sclera: Conjunctivae normal.      Pupils: Pupils are equal, round, and reactive to light.   Cardiovascular:      Rate and Rhythm: Normal rate and regular rhythm.      Heart sounds: Normal heart sounds.   Pulmonary:      Effort: Pulmonary effort is normal.      Breath sounds: Normal breath sounds.   Abdominal:      General: Bowel sounds are normal.      Palpations: Abdomen is soft.   Musculoskeletal:         General: Normal range of motion.      Cervical back: Normal range of motion and neck supple.   Skin:     General: Skin is warm and dry.      Capillary Refill: Capillary refill takes less than 2 seconds.   Neurological:      Mental Status: She is alert and oriented to person, place, and time.   Psychiatric:         Behavior: Behavior normal.         Thought Content: Thought content normal.         Judgment: Judgment normal.

## 2025-01-16 ENCOUNTER — HOSPITAL ENCOUNTER (OUTPATIENT)
Dept: RADIOLOGY | Age: 65
Discharge: HOME | End: 2025-01-16
Payer: COMMERCIAL

## 2025-01-16 VITALS — WEIGHT: 136 LBS | BODY MASS INDEX: 23.22 KG/M2 | HEIGHT: 64 IN

## 2025-01-16 DIAGNOSIS — Z12.31 VISIT FOR SCREENING MAMMOGRAM: ICD-10-CM

## 2025-01-16 PROCEDURE — 77063 BREAST TOMOSYNTHESIS BI: CPT

## 2025-01-16 PROCEDURE — 77067 SCR MAMMO BI INCL CAD: CPT

## 2025-01-23 PROBLEM — J01.00 ACUTE NON-RECURRENT MAXILLARY SINUSITIS: Status: RESOLVED | Noted: 2020-11-24 | Resolved: 2025-01-23

## 2025-01-27 ENCOUNTER — TELEPHONE (OUTPATIENT)
Age: 65
End: 2025-01-27

## 2025-01-27 NOTE — TELEPHONE ENCOUNTER
Patient called in asking for the results of her mammogram once PCP reviews them please. She has a concern about her results as breast cancer runs in her family.

## 2025-01-27 NOTE — TELEPHONE ENCOUNTER
Patient is calling back in to let the office know that she was able to get into her chart to view her results and does not need a call back

## 2025-01-31 ENCOUNTER — TELEPHONE (OUTPATIENT)
Dept: GASTROENTEROLOGY | Facility: CLINIC | Age: 65
End: 2025-01-31

## 2025-01-31 DIAGNOSIS — Z86.0100 HX OF COLONIC POLYPS: Primary | ICD-10-CM

## 2025-01-31 NOTE — TELEPHONE ENCOUNTER
Pt is due for a Colonoscopy due to a personal history of colon polyps with Dr Tapia.       01/31/25  Screened by: Tyra Red MA    Referring Provider Dr Tapia    Pre- Screening:     There is no height or weight on file to calculate BMI.  Has patient been referred for a routine screening Colonoscopy? yes  Is the patient between 45-75 years old? yes      Previous Colonoscopy yes   If yes:    Date: recall     Facility:     Reason:       SCHEDULING STAFF: If the patient is between 45yrs-49yrs, please advise patient to confirm benefits/coverage with their insurance company for a routine screening colonoscopy, some insurance carriers will only cover at 50yrs or older. If the patient is over 75years old, please schedule an office visit.     Does the patient want to see a Gastroenterologist prior to their procedure OR are they having any GI symptoms? no    Has the patient been hospitalized or had abdominal surgery in the past 6 months? no    Does the patient use supplemental oxygen? no    Does the patient take Coumadin, Lovenox, Plavix, Elliquis, Xarelto, or other blood thinning medication? no    Has the patient had a stroke, cardiac event, or stent placed in the past year? no    SCHEDULING STAFF: If patient answers NO to above questions, then schedule procedure. If patient answers YES to above questions, then schedule office appointment.     If patient is between 45yrs - 49yrs, please advise patient that we will have to confirm benefits & coverage with their insurance company for a routine screening colonoscopy.

## 2025-01-31 NOTE — TELEPHONE ENCOUNTER
Scheduled date of colonoscopy (as of today): 4/28/25  Physician performing colonoscopy: Dr Tapia  Location of colonoscopy[facility]  Bowel prep reviewed with patient: Golytely mailed   Instructions reviewed with patient by: christopher  Clearances: n/a

## 2025-02-04 ENCOUNTER — OFFICE VISIT (OUTPATIENT)
Dept: FAMILY MEDICINE CLINIC | Facility: CLINIC | Age: 65
End: 2025-02-04
Payer: COMMERCIAL

## 2025-02-04 VITALS
HEART RATE: 52 BPM | DIASTOLIC BLOOD PRESSURE: 80 MMHG | BODY MASS INDEX: 22.49 KG/M2 | RESPIRATION RATE: 16 BRPM | TEMPERATURE: 96.6 F | WEIGHT: 135 LBS | HEIGHT: 65 IN | OXYGEN SATURATION: 98 % | SYSTOLIC BLOOD PRESSURE: 128 MMHG

## 2025-02-04 DIAGNOSIS — Z00.00 ANNUAL PHYSICAL EXAM: Primary | ICD-10-CM

## 2025-02-04 PROCEDURE — 99396 PREV VISIT EST AGE 40-64: CPT | Performed by: FAMILY MEDICINE

## 2025-02-04 NOTE — PROGRESS NOTES
Adult Annual Physical  Name: Elma Davenport      : 1960      MRN: [id number]  Encounter Provider: Janie Cooney DO  Encounter Date: 2025   Encounter [facility]: [facility]    Assessment & Plan  Annual physical exam  Scheduled for colonoscopy in April  Mammogram schedule       Immunizations and preventive care screenings were discussed with patient today. Appropriate education was printed on patient's after visit summary.    Counseling:  Dental Health: discussed importance of regular tooth brushing, flossing, and dental visits.      Depression Screening and Follow-up Plan: Patient was screened for depression during today's encounter. They screened negative with a PHQ-2 score of 0.        History of Present Illness     Adult Annual Physical:  Patient presents for annual physical. Here for wellness.     Diet and Physical Activity:  - Diet/Nutrition: well balanced diet.  - Exercise: walking.    Depression Screening:  - PHQ-2 Score: 0    General Health:  - Sleep: sleeps well.  - Hearing: normal hearing right ear and normal hearing left ear.  - Vision: no vision problems.  - Dental: regular dental visits.    /GYN Health:  - Follows with GYN: no.   - Menopause: postmenopausal.     Advanced Care Planning:  - Has an advanced directive?: yes    - Has a durable medical POA?: yes      Review of Systems   Constitutional: Negative.    HENT: Negative.     Eyes: Negative.    Respiratory: Negative.     Cardiovascular: Negative.    Gastrointestinal: Negative.    Endocrine: Negative.    Genitourinary: Negative.    Musculoskeletal: Negative.    Allergic/Immunologic: Negative.    Neurological: Negative.    Hematological: Negative.    Psychiatric/Behavioral: Negative.       Medical History Reviewed by provider this encounter:  Tobacco  Allergies  Meds  Problems  Med Hx  Surg Hx  Fam Hx     .    Objective   /80 (BP Location: Left arm, Patient Position: Sitting, Cuff Size: Standard)    "Pulse (!) 52   Temp (!) 96.6 °F (35.9 °C) (Tympanic)   Resp 16   Ht 5' 4.69\" (1.643 m)   Wt 61.2 kg (135 lb)   SpO2 98%   BMI 22.68 kg/m²     Physical Exam  Vitals and nursing note reviewed.   Constitutional:       Appearance: Normal appearance. She is well-developed.   HENT:      Head: Normocephalic and atraumatic.      Right Ear: External ear normal.      Left Ear: External ear normal.      Nose: Nose normal.   Eyes:      Extraocular Movements: Extraocular movements intact.      Conjunctiva/sclera: Conjunctivae normal.      Pupils: Pupils are equal, round, and reactive to light.   Cardiovascular:      Rate and Rhythm: Normal rate and regular rhythm.      Heart sounds: Normal heart sounds.   Pulmonary:      Effort: Pulmonary effort is normal.      Breath sounds: Normal breath sounds.   Abdominal:      General: Bowel sounds are normal.      Palpations: Abdomen is soft.   Musculoskeletal:         General: Normal range of motion.      Cervical back: Normal range of motion and neck supple.   Skin:     General: Skin is warm and dry.      Capillary Refill: Capillary refill takes less than 2 seconds.   Neurological:      Mental Status: She is alert and oriented to person, place, and time.   Psychiatric:         Behavior: Behavior normal.         Thought Content: Thought content normal.         Judgment: Judgment normal.         "

## 2025-02-04 NOTE — PATIENT INSTRUCTIONS
"Patient Education     Routine physical for adults   The Basics   Written by the doctors and editors at [facility]   What is a physical? -- A physical is a routine visit, or \"check-up,\" with your doctor. You might also hear it called a \"wellness visit\" or \"preventive visit.\"  During each visit, the doctor will:   Ask about your physical and mental health   Ask about your habits, behaviors, and lifestyle   Do an exam   Give you vaccines if needed   Talk to you about any medicines you take   Give advice about your health   Answer your questions  Getting regular check-ups is an important part of taking care of your health. It can help your doctor find and treat any problems you have. But it's also important for preventing health problems.  A routine physical is different from a \"sick visit.\" A sick visit is when you see a doctor because of a health concern or problem. Since physicals are scheduled ahead of time, you can think about what you want to ask the doctor.  How often should I get a physical? -- It depends on your age and health. In general, for people age 21 years and older:   If you are younger than 50 years, you might be able to get a physical every 3 years.   If you are 50 years or older, your doctor might recommend a physical every year.  If you have an ongoing health condition, like diabetes or high blood pressure, your doctor will probably want to see you more often.  What happens during a physical? -- In general, each visit will include:   Physical exam - The doctor or nurse will check your height, weight, heart rate, and blood pressure. They will also look at your eyes and ears. They will ask about how you are feeling and whether you have any symptoms that bother you.   Medicines - It's a good idea to bring a list of all the medicines you take to each doctor visit. Your doctor will talk to you about your medicines and answer any questions. Tell them if you are having any side effects that bother you. You " "should also tell them if you are having trouble paying for any of your medicines.   Habits and behaviors - This includes:   Your diet   Your exercise habits   Whether you smoke, drink alcohol, or use drugs   Whether you are sexually active   Whether you feel safe at home  Your doctor will talk to you about things you can do to improve your health and lower your risk of health problems. They will also offer help and support. For example, if you want to quit smoking, they can give you advice and might prescribe medicines. If you want to improve your diet or get more physical activity, they can help you with this, too.   Lab tests, if needed - The tests you get will depend on your age and situation. For example, your doctor might want to check your:   Cholesterol   Blood sugar   Iron level   Vaccines - The recommended vaccines will depend on your age, health, and what vaccines you already had. Vaccines are very important because they can prevent certain serious or deadly infections.   Discussion of screening - \"Screening\" means checking for diseases or other health problems before they cause symptoms. Your doctor can recommend screening based on your age, risk, and preferences. This might include tests to check for:   Cancer, such as breast, prostate, cervical, ovarian, colorectal, prostate, lung, or skin cancer   Sexually transmitted infections, such as chlamydia and gonorrhea   Mental health conditions like depression and anxiety  Your doctor will talk to you about the different types of screening tests. They can help you decide which screenings to have. They can also explain what the results might mean.   Answering questions - The physical is a good time to ask the doctor or nurse questions about your health. If needed, they can refer you to other doctors or specialists, too.  Adults older than 65 years often need other care, too. As you get older, your doctor will talk to you about:   How to prevent falling at " home   Hearing or vision tests   Memory testing   How to take your medicines safely   Making sure that you have the help and support you need at home  All topics are updated as new evidence becomes available and our peer review process is complete.  This topic retrieved from [employer] on: May 02, 2024.  Topic 604273 Version 1.0  Release: 32.4.3 - C32.122  © 2024 UpToDate, Inc. and/or its affiliates. All rights reserved.  Consumer Information Use and Disclaimer   Disclaimer: This generalized information is a limited summary of diagnosis, treatment, and/or medication information. It is not meant to be comprehensive and should be used as a tool to help the user understand and/or assess potential diagnostic and treatment options. It does NOT include all information about conditions, treatments, medications, side effects, or risks that may apply to a specific patient. It is not intended to be medical advice or a substitute for the medical advice, diagnosis, or treatment of a health care provider based on the health care provider's examination and assessment of a patient's specific and unique circumstances. Patients must speak with a health care provider for complete information about their health, medical questions, and treatment options, including any risks or benefits regarding use of medications. This information does not endorse any treatments or medications as safe, effective, or approved for treating a specific patient. UpToDate, Inc. and its affiliates disclaim any warranty or liability relating to this information or the use thereof.The use of this information is governed by the Terms of Use, available at https://www.[employer] Solutionsuwer.com/en/know/clinical-effectiveness-terms. 2024© UpToDate, Inc. and its affiliates and/or licensors. All rights reserved.  Copyright   © 2024 UpToDate, Inc. and/or its affiliates. All rights reserved.

## 2025-02-21 ENCOUNTER — VBI (OUTPATIENT)
Dept: ADMINISTRATIVE | Facility: OTHER | Age: 65
End: 2025-02-21

## 2025-02-21 NOTE — TELEPHONE ENCOUNTER
02/21/25 7:14 AM     Chart reviewed for   Cervical Cancer Screening    ; nothing is submitted to the patient's insurance at this time.     ROLY DIXON MA   [facility]

## 2025-03-07 DIAGNOSIS — Z79.890 HORMONE REPLACEMENT THERAPY (HRT): ICD-10-CM

## 2025-03-07 RX ORDER — ESTRADIOL 0.1 MG/D
FILM, EXTENDED RELEASE TRANSDERMAL
Qty: 24 PATCH | Refills: 1 | Status: SHIPPED | OUTPATIENT
Start: 2025-03-07

## 2025-04-07 ENCOUNTER — TELEMEDICINE (OUTPATIENT)
Dept: FAMILY MEDICINE CLINIC | Facility: CLINIC | Age: 65
End: 2025-04-07
Payer: COMMERCIAL

## 2025-04-07 DIAGNOSIS — J01.00 ACUTE NON-RECURRENT MAXILLARY SINUSITIS: Primary | ICD-10-CM

## 2025-04-07 PROCEDURE — 99213 OFFICE O/P EST LOW 20 MIN: CPT | Performed by: FAMILY MEDICINE

## 2025-04-07 NOTE — ASSESSMENT & PLAN NOTE
Cont probiotic  Orders:  •  amoxicillin-clavulanate (AUGMENTIN) 875-125 mg per tablet; Take 1 tablet by mouth every 12 (twelve) hours for 10 days

## 2025-04-07 NOTE — PROGRESS NOTES
Virtual Regular VisitName: Elma Davenport      : 1960      MRN: [id number]  Encounter Provider: Janie Cooney DO  Encounter Date: 2025   Enc[facility]: [facility]  :  Assessment & Plan  Acute non-recurrent maxillary sinusitis  Cont probiotic  Orders:  •  amoxicillin-clavulanate (AUGMENTIN) 875-125 mg per tablet; Take 1 tablet by mouth every 12 (twelve) hours for 10 days        History of Present Illness     Started 1 week ago  Taking dayquil and nyquil    Cough  This is a new problem. The current episode started in the past 7 days. The problem has been unchanged. The cough is Non-productive. Associated symptoms include nasal congestion, postnasal drip and a sore throat. Pertinent negatives include no ear congestion, fever or headaches. Nothing aggravates the symptoms. She has tried prescription cough suppressant for the symptoms. The treatment provided no relief.   Sinus Problem  This is a new problem. The current episode started in the past 7 days. There has been no fever. Associated symptoms include coughing, sinus pressure, sneezing and a sore throat. Pertinent negatives include no headaches. Past treatments include oral decongestants. The treatment provided moderate relief.     Review of Systems   Constitutional:  Negative for fever.   HENT:  Positive for postnasal drip, sinus pressure, sneezing and sore throat.    Eyes: Negative.    Respiratory:  Positive for cough.    Cardiovascular: Negative.    Gastrointestinal: Negative.    Neurological:  Negative for headaches.       Objective   There were no vitals taken for this visit.    Physical Exam  Vitals and nursing note reviewed.   Constitutional:       Appearance: She is well-developed.   HENT:      Head: Normocephalic and atraumatic.      Right Ear: External ear normal.      Left Ear: External ear normal.      Nose: Nose normal.   Eyes:      Conjunctiva/sclera: Conjunctivae normal.      Pupils: Pupils are equal,  round, and reactive to light.   Cardiovascular:      Rate and Rhythm: Normal rate and regular rhythm.      Heart sounds: Normal heart sounds.   Pulmonary:      Effort: Pulmonary effort is normal.      Breath sounds: Normal breath sounds.   Abdominal:      General: Bowel sounds are normal.      Palpations: Abdomen is soft.   Musculoskeletal:         General: Normal range of motion.      Cervical back: Normal range of motion and neck supple.   Skin:     General: Skin is warm and dry.      Capillary Refill: Capillary refill takes less than 2 seconds.   Neurological:      Mental Status: She is alert and oriented to person, place, and time.   Psychiatric:         Behavior: Behavior normal.         Thought Content: Thought content normal.         Judgment: Judgment normal.         Administrative Statements   Encounter provider Janie Cooney, DO    The Patient is located at Home and in the following state in which I hold an active license [place].    The patient was identified by name and date of birth. Elma CISCO Davenport was informed that this is a telemedicine visit and that the visit is being conducted through the Epic Embedded platform. She agrees to proceed..  My office door was closed. No one else was in the room.  She acknowledged consent and understanding of privacy and security of the video platform. The patient has agreed to participate and understands they can discontinue the visit at any time.    I have spent a total time of 15 minutes in caring for this patient on the day of the visit/encounter including Prognosis, not including the time spent for establishing the audio/video connection.

## 2025-04-28 ENCOUNTER — ANESTHESIA EVENT (OUTPATIENT)
Dept: GASTROENTEROLOGY | Facility: HOSPITAL | Age: 65
End: 2025-04-28
Payer: COMMERCIAL

## 2025-04-28 ENCOUNTER — HOSPITAL ENCOUNTER (OUTPATIENT)
Dept: GASTROENTEROLOGY | Facility: HOSPITAL | Age: 65
Setting detail: OUTPATIENT SURGERY
Discharge: HOME | End: 2025-04-28
Attending: INTERNAL MEDICINE
Payer: COMMERCIAL

## 2025-04-28 ENCOUNTER — ANESTHESIA (OUTPATIENT)
Dept: GASTROENTEROLOGY | Facility: HOSPITAL | Age: 65
End: 2025-04-28
Payer: COMMERCIAL

## 2025-04-28 VITALS
BODY MASS INDEX: 22.53 KG/M2 | OXYGEN SATURATION: 92 % | HEART RATE: 55 BPM | RESPIRATION RATE: 16 BRPM | DIASTOLIC BLOOD PRESSURE: 55 MMHG | WEIGHT: 132 LBS | TEMPERATURE: 97 F | SYSTOLIC BLOOD PRESSURE: 92 MMHG | HEIGHT: 64 IN

## 2025-04-28 DIAGNOSIS — Z86.0100 HX OF COLONIC POLYPS: ICD-10-CM

## 2025-04-28 PROCEDURE — G0121 COLON CA SCRN NOT HI RSK IND: HCPCS | Performed by: INTERNAL MEDICINE

## 2025-04-28 RX ORDER — SODIUM CHLORIDE, SODIUM LACTATE, POTASSIUM CHLORIDE, CALCIUM CHLORIDE 600; 310; 30; 20 MG/100ML; MG/100ML; MG/100ML; MG/100ML
INJECTION, SOLUTION INTRAVENOUS CONTINUOUS PRN
Status: DISCONTINUED | OUTPATIENT
Start: 2025-04-28 | End: 2025-04-28

## 2025-04-28 RX ORDER — PROPOFOL 10 MG/ML
INJECTION, EMULSION INTRAVENOUS CONTINUOUS PRN
Status: DISCONTINUED | OUTPATIENT
Start: 2025-04-28 | End: 2025-04-28

## 2025-04-28 RX ORDER — LIDOCAINE HYDROCHLORIDE 10 MG/ML
INJECTION, SOLUTION EPIDURAL; INFILTRATION; INTRACAUDAL; PERINEURAL AS NEEDED
Status: DISCONTINUED | OUTPATIENT
Start: 2025-04-28 | End: 2025-04-28

## 2025-04-28 RX ORDER — PROPOFOL 10 MG/ML
INJECTION, EMULSION INTRAVENOUS AS NEEDED
Status: DISCONTINUED | OUTPATIENT
Start: 2025-04-28 | End: 2025-04-28

## 2025-04-28 RX ADMIN — SODIUM CHLORIDE, SODIUM LACTATE, POTASSIUM CHLORIDE, AND CALCIUM CHLORIDE: .6; .31; .03; .02 INJECTION, SOLUTION INTRAVENOUS at 12:43

## 2025-04-28 RX ADMIN — LIDOCAINE HYDROCHLORIDE 50 MG: 10 INJECTION, SOLUTION EPIDURAL; INFILTRATION; INTRACAUDAL at 12:48

## 2025-04-28 RX ADMIN — PROPOFOL 100 MCG/KG/MIN: 10 INJECTION, EMULSION INTRAVENOUS at 12:48

## 2025-04-28 RX ADMIN — Medication 40 MG: at 12:51

## 2025-04-28 RX ADMIN — PROPOFOL 150 MG: 10 INJECTION, EMULSION INTRAVENOUS at 12:48

## 2025-04-28 NOTE — H&P
History and Physical - [facility]  Elma CISCO Davenport 64 y.o. female MRN: [id number]        HPI: 64-year-old female with history of colon polyps.  Regular bowel movements.    Historical Information   Past Medical History:   Diagnosis Date    Abdominal wall pain in right lower quadrant 2022    Acquired cyst of canal of [place] 2022    Colon polyp     Conjunctival hemorrhage, right 2019    COVID 2022    COVID-19     Ecchymoses, spontaneous     Last assessed 16    Edema     Last assessed 13    Fibrocystic breast     H/O total hysterectomy     Impetigo     last assessed 16    Inguinal hernia 2022    Irritation of eyelid 2023    Left ear pain 2018     Past Surgical History:   Procedure Laterality Date     SECTION      COLON SURGERY      HERNIA REPAIR      OOPHORECTOMY Bilateral     age 43    WY RPR 1ST INGUN HRNA AGE 5 YRS/> REDUCIBLE Right 2022    Procedure: REPAIR HERNIA INGUINALWITH MESH;  Surgeon: Raji Lopez MD;  Location: [facility];  Service: General    TOTAL ABDOMINAL HYSTERECTOMY      age 43     Social History   Social History     Substance and Sexual Activity   Alcohol Use Yes    Alcohol/week: 2.0 standard drinks of alcohol    Types: 2 Glasses of wine per week    Comment: social-once per week     Social History     Substance and Sexual Activity   Drug Use No     Social History     Tobacco Use   Smoking Status Former    Current packs/day: 0.00    Average packs/day: 1 pack/day for 20.0 years (20.0 ttl pk-yrs)    Types: Cigarettes    Start date: 1980    Quit date: 2000    Years since quittin.3    Passive exposure: Never   Smokeless Tobacco Never     Family History   Problem Relation Age of Onset    Hyperlipidemia Mother     Hypertension Mother     Heart attack Mother     Aneurysm Mother     COPD Mother     Coronary artery disease Mother     Diabetes Mother     Hyperlipidemia Father     Hypertension Father   "   COPD Father     Heart attack Father     Emphysema Father     Breast cancer Sister 58    Atrial fibrillation Sister     COPD Sister     Emphysema Sister     Heart attack Sister     Cancer Sister     Hypertension Sister 61    Lung cancer Sister     Cancer Sister         Lung    No Known Problems Brother     No Known Problems Son     No Known Problems Son     Stroke Maternal Grandfather     Diabetes Paternal Grandmother     Stroke Paternal Grandfather     Breast cancer Paternal Aunt         unknown age    No Known Problems Paternal Aunt     No Known Problems Paternal Aunt     No Known Problems Paternal Aunt     Lung cancer Paternal Uncle         unknown age    Crohn's disease Family     Breast cancer Family     Lung cancer Family        Meds/Allergies     Not in a hospital admission.    No Known Allergies    Objective     Blood pressure 149/65, pulse (!) 48, temperature 97.8 °F (36.6 °C), temperature source Temporal, resp. rate 18, height 4' 5\" (1.346 m), weight 59.9 kg (132 lb), SpO2 100%, not currently breastfeeding.    Physical Exam:    Chest- CTA  Heart- RRR  Abdomen- NT/ND  Extremities- No edema    ASSESSMENT:     History of polyps    PLAN:    Colonoscopy              "

## 2025-04-28 NOTE — ANESTHESIA POSTPROCEDURE EVALUATION
Post-Op Assessment Note    CV Status:  Stable    Pain management: adequate       Mental Status:  Alert and awake   Hydration Status:  Euvolemic   PONV Controlled:  Controlled   Airway Patency:  Patent     Post Op Vitals Reviewed: Yes    No anethesia notable event occurred.    Staff: CRNA       Last Filed PACU Vitals:  Vitals Value Taken Time   Temp     Pulse 50    /55    Resp     SpO2 100

## 2025-04-28 NOTE — ANESTHESIA PREPROCEDURE EVALUATION
Procedure:  COLONOSCOPY    Relevant Problems   No relevant active problems        Physical Exam    Airway    Mallampati score: II  TM Distance: >3 FB  Neck ROM: full     Dental   No notable dental hx     Cardiovascular  Rhythm: regular, Rate: normal    Pulmonary   Breath sounds clear to auscultation    Other Findings  post-pubertal.      Anesthesia Plan  ASA Score- 2     Anesthesia Type- IV sedation with anesthesia with ASA Monitors.         Additional Monitors:     Airway Plan:            Plan Factors-Exercise tolerance (METS): >4 METS.    Chart reviewed.   Existing labs reviewed. Patient summary reviewed.    Patient is not a current smoker.              Induction- intravenous.    Postoperative Plan-         Informed Consent- Anesthetic plan and risks discussed with patient.  I personally reviewed this patient with the CRNA. Discussed and agreed on the Anesthesia Plan with the CRNA..      NPO Status:  Vitals Value Taken Time   Date of last liquid 04/28/25 04/28/25 1145   Time of last liquid 0630 04/28/25 1145   Date of last solid 04/26/25 04/28/25 1145   Time of last solid 2000 04/28/25 1145

## 2025-05-07 PROBLEM — J01.00 ACUTE NON-RECURRENT MAXILLARY SINUSITIS: Status: RESOLVED | Noted: 2025-04-07 | Resolved: 2025-05-07

## 2025-08-21 DIAGNOSIS — Z79.890 HORMONE REPLACEMENT THERAPY (HRT): ICD-10-CM

## 2025-08-22 RX ORDER — ESTRADIOL 0.1 MG/D
FILM, EXTENDED RELEASE TRANSDERMAL
Qty: 24 PATCH | Refills: 1 | Status: SHIPPED | OUTPATIENT
Start: 2025-08-22

## (undated) DEVICE — SUT VICRYL 2-0 REEL 54 IN J286G

## (undated) DEVICE — ADHESIVE SKIN HIGH VISCOSITY EXOFIN 1ML

## (undated) DEVICE — ELECTRODE BLADE MOD E-Z CLEAN 2.5IN 6.4CM -0012M

## (undated) DEVICE — DRAPE EQUIPMENT RF WAND

## (undated) DEVICE — SUT PDS II 3-0 SH 27 IN Z316H

## (undated) DEVICE — SUT VICRYL 0 CT-1 27 IN J260H

## (undated) DEVICE — PAD GROUNDING ADULT

## (undated) DEVICE — TOWEL SURG XR DETECT GREEN STRL RFD

## (undated) DEVICE — GLOVE SRG BIOGEL ECLIPSE 7

## (undated) DEVICE — SUT MONOCRYL 4-0 PS-2 18 IN Y496G

## (undated) DEVICE — VIAL DECANTER

## (undated) DEVICE — CHLORAPREP HI-LITE 26ML ORANGE

## (undated) DEVICE — NEEDLE 22 G X 1 1/2 SAFETY

## (undated) DEVICE — SUT VICRYL 3-0 SH 27 IN J416H

## (undated) DEVICE — BETHLEHEM UNIVERSAL MINOR GEN: Brand: CARDINAL HEALTH

## (undated) DEVICE — SUT VICRYL 1 CT-1 27 IN J261H

## (undated) DEVICE — PENCIL ELECTROSURG E-Z CLEAN -0035H

## (undated) DEVICE — BULB SYRINGE,IRRIGATION WITH PROTECTIVE CAP: Brand: DOVER

## (undated) DEVICE — INTENDED FOR TISSUE SEPARATION, AND OTHER PROCEDURES THAT REQUIRE A SHARP SURGICAL BLADE TO PUNCTURE OR CUT.: Brand: BARD-PARKER SAFETY BLADES SIZE 15, STERILE